# Patient Record
Sex: MALE | Race: WHITE | NOT HISPANIC OR LATINO | URBAN - METROPOLITAN AREA
[De-identification: names, ages, dates, MRNs, and addresses within clinical notes are randomized per-mention and may not be internally consistent; named-entity substitution may affect disease eponyms.]

---

## 2024-08-08 ENCOUNTER — OFFICE VISIT (OUTPATIENT)
Dept: INTERNAL MEDICINE | Facility: CLINIC | Age: 39
End: 2024-08-08

## 2024-08-08 ENCOUNTER — CLINICAL SUPPORT (OUTPATIENT)
Dept: INTERNAL MEDICINE | Facility: CLINIC | Age: 39
End: 2024-08-08

## 2024-08-08 VITALS
BODY MASS INDEX: 24.76 KG/M2 | HEART RATE: 60 BPM | DIASTOLIC BLOOD PRESSURE: 81 MMHG | SYSTOLIC BLOOD PRESSURE: 116 MMHG | WEIGHT: 163.38 LBS | HEIGHT: 68 IN | OXYGEN SATURATION: 98 %

## 2024-08-08 DIAGNOSIS — Z00.00 ROUTINE GENERAL MEDICAL EXAMINATION AT A HEALTH CARE FACILITY: Primary | ICD-10-CM

## 2024-08-08 DIAGNOSIS — Z00.00 ENCOUNTER FOR SCREENING AND PREVENTATIVE CARE: Primary | ICD-10-CM

## 2024-08-08 DIAGNOSIS — G89.29 CHRONIC BACK PAIN, UNSPECIFIED BACK LOCATION, UNSPECIFIED BACK PAIN LATERALITY: ICD-10-CM

## 2024-08-08 DIAGNOSIS — M54.9 CHRONIC BACK PAIN, UNSPECIFIED BACK LOCATION, UNSPECIFIED BACK PAIN LATERALITY: ICD-10-CM

## 2024-08-08 LAB
ALBUMIN SERPL BCP-MCNC: 4.4 G/DL (ref 3.5–5.2)
ALP SERPL-CCNC: 63 U/L (ref 55–135)
ALT SERPL W/O P-5'-P-CCNC: 69 U/L (ref 10–44)
ANION GAP SERPL CALC-SCNC: 10 MMOL/L (ref 8–16)
AST SERPL-CCNC: 45 U/L (ref 10–40)
BILIRUB SERPL-MCNC: 0.8 MG/DL (ref 0.1–1)
BUN SERPL-MCNC: 14 MG/DL (ref 6–20)
CALCIUM SERPL-MCNC: 9.5 MG/DL (ref 8.7–10.5)
CHLORIDE SERPL-SCNC: 105 MMOL/L (ref 95–110)
CHOLEST SERPL-MCNC: 180 MG/DL (ref 120–199)
CHOLEST/HDLC SERPL: 4.1 {RATIO} (ref 2–5)
CO2 SERPL-SCNC: 26 MMOL/L (ref 23–29)
CREAT SERPL-MCNC: 0.8 MG/DL (ref 0.5–1.4)
ERYTHROCYTE [DISTWIDTH] IN BLOOD BY AUTOMATED COUNT: 12.8 % (ref 11.5–14.5)
EST. GFR  (NO RACE VARIABLE): >60 ML/MIN/1.73 M^2
ESTIMATED AVG GLUCOSE: 108 MG/DL (ref 68–131)
GLUCOSE SERPL-MCNC: 100 MG/DL (ref 70–110)
HBA1C MFR BLD: 5.4 % (ref 4–5.6)
HCT VFR BLD AUTO: 45.4 % (ref 40–54)
HCV AB SERPL QL IA: NORMAL
HDLC SERPL-MCNC: 44 MG/DL (ref 40–75)
HDLC SERPL: 24.4 % (ref 20–50)
HGB BLD-MCNC: 15.2 G/DL (ref 14–18)
HIV 1+2 AB+HIV1 P24 AG SERPL QL IA: NORMAL
LDLC SERPL CALC-MCNC: 119.8 MG/DL (ref 63–159)
MCH RBC QN AUTO: 31.1 PG (ref 27–31)
MCHC RBC AUTO-ENTMCNC: 33.5 G/DL (ref 32–36)
MCV RBC AUTO: 93 FL (ref 82–98)
NONHDLC SERPL-MCNC: 136 MG/DL
PLATELET # BLD AUTO: 274 K/UL (ref 150–450)
PMV BLD AUTO: 9.7 FL (ref 9.2–12.9)
POTASSIUM SERPL-SCNC: 4 MMOL/L (ref 3.5–5.1)
PROT SERPL-MCNC: 7.5 G/DL (ref 6–8.4)
RBC # BLD AUTO: 4.89 M/UL (ref 4.6–6.2)
SODIUM SERPL-SCNC: 141 MMOL/L (ref 136–145)
TRIGL SERPL-MCNC: 81 MG/DL (ref 30–150)
TSH SERPL DL<=0.005 MIU/L-ACNC: 1.59 UIU/ML (ref 0.4–4)
WBC # BLD AUTO: 9.19 K/UL (ref 3.9–12.7)

## 2024-08-08 PROCEDURE — 99999 PR PBB SHADOW E&M-EST. PATIENT-LVL I: CPT | Mod: PBBFAC,,,

## 2024-08-08 PROCEDURE — 83036 HEMOGLOBIN GLYCOSYLATED A1C: CPT | Performed by: EMERGENCY MEDICINE

## 2024-08-08 PROCEDURE — 80053 COMPREHEN METABOLIC PANEL: CPT | Performed by: EMERGENCY MEDICINE

## 2024-08-08 PROCEDURE — 99211 OFF/OP EST MAY X REQ PHY/QHP: CPT | Mod: PBBFAC

## 2024-08-08 PROCEDURE — 99999 PR PBB SHADOW E&M-NEW PATIENT-LVL IV: CPT | Mod: PBBFAC,,, | Performed by: EMERGENCY MEDICINE

## 2024-08-08 PROCEDURE — 85027 COMPLETE CBC AUTOMATED: CPT | Performed by: EMERGENCY MEDICINE

## 2024-08-08 PROCEDURE — 87389 HIV-1 AG W/HIV-1&-2 AB AG IA: CPT | Performed by: EMERGENCY MEDICINE

## 2024-08-08 PROCEDURE — 99204 OFFICE O/P NEW MOD 45 MIN: CPT | Mod: PBBFAC,27 | Performed by: EMERGENCY MEDICINE

## 2024-08-08 PROCEDURE — 99385 PREV VISIT NEW AGE 18-39: CPT | Mod: S$PBB,,, | Performed by: EMERGENCY MEDICINE

## 2024-08-08 PROCEDURE — 80061 LIPID PANEL: CPT | Performed by: EMERGENCY MEDICINE

## 2024-08-08 PROCEDURE — 84443 ASSAY THYROID STIM HORMONE: CPT | Performed by: EMERGENCY MEDICINE

## 2024-08-08 PROCEDURE — 86803 HEPATITIS C AB TEST: CPT | Performed by: EMERGENCY MEDICINE

## 2024-08-08 NOTE — PROGRESS NOTES
[facility]      TODAY'S Date 8/8/2024  Patient ID: Delma Christianson is a 39 y.o. male   MRN: [id number]  Primary Care Physician (PCP):  Urmila, Primary Doctor    SUBJECTIVE     Chief Complaint:   Chief Complaint   Patient presents with    [facility]      HPI:   Reviewed medical, surgical, social and family history, medications, appropriate preventive health screenings, as well as vaccination history. Updates as noted below or in assessment and plan.    This is a very pleasant 39 y.o. nonsmoking male with no significant past medical history.  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 The patient is currently in good health, with the exception of some arthralgias and myalgias.  He reports a history of a broken toe sustained while snowboarding in college, which continues to cause pain around the toe. He also has a diagnosis of patellar tendonitis and plantar fasciitis, for which he is currently managing with stretching exercises. His exercise routine includes push-ups, sit-ups, calisthenics, machine-based workouts, and yoga. He reports experiencing stiffness in his back in the morning.  Functionally, the patient does not report limitations due to his symptoms as he is still able to do his hobbies including yard work, arts, and crafts.  He has been  for 16 months, with a relationship spanning 5-6 years. His wife primarily works remotely in [employer]. His typical sleep schedule is from 9:00 PM to 6:30-7:30 AM, during which he snores. He may nap once and feels better after a brief nap.  He follows a vegan diet, supplemented with a multivitamin, collagen gummies, and he consumes 1-2 cups of coffee daily, with the last cup at 2 PM. He also drinks water regularly and had one glass of wine this week, noting that he had not consumed alcohol for the previous three  years.    SCREENING:   Prostate:    n/a as < 50 years old and no family history  Colonoscopy:    n/a as < 45 years old and no family history colon cancer  DEXA:     N/a as < 70 years old and little to no clinical risk factors for fracture independent of bone mineral density   Depression:    Negative   Anxiety:    Negative  Medications:    Reviewed and Reconciled  Eye Exam:    UTD, wears corrective lens  Dental Exam:    Routine q 6 months   Ear Exam:    No complaints of hearing loss, tinnitus, noise exposure, or vertigo   Skin:     No new concerning moles or lesions   Routinely uses sunscreen.   Negative for recent sunburn, moderate sun exposure in the past, several blistering sunburns, mole removal.  No history of tanning bed use or melanoma in first degree relatives.  Sex:     Monogamous relationship, contraception by condom  Transportation safety:   Uses restraint consistently, does not drink alcohol before or while driving  Firearm safety:   Discussed  Lung cancer risk/Tobacco use:   Tobacco Use: Low Risk  (8/14/2024)    Patient History     Smoking Tobacco Use: Never     Smokeless Tobacco Use: Never     Passive Exposure: Not on file         There is no immunization history on file for this patient.  Past Medical History:   Diagnosis Date    Known health problems: none      Past Surgical History:   Procedure Laterality Date    NO PAST SURGERIES       Family history: Previous history in mother of kidney disease has been removed  Social History     Tobacco Use    Smoking status: Never    Smokeless tobacco: Never   Substance Use Topics    Alcohol use: Not Currently     Alcohol/week: 1.0 standard drink of alcohol     Types: 1 Glasses of wine per week    Drug use: Never     Past Medical, Surgical and Social history reviewed and verified by me.     Review of patient's allergies indicates:  No Known Allergies  No current outpatient medications on file prior to visit.     No current facility-administered medications on  "file prior to visit.       Review of Systems as per HPI  ROS  Constitutional: Negative for activity change, appetite change, fatigue, diaphoresis, chills and fever.   Respiratory: Negative for apnea, choking, chest tightness and wheezing.  Genitourinary: Negative for hematuria, flank pain, frequency and dysuria.   Skin: Negative for color change, pallor, rash and wound. Patient denies concerning moles or lesions.  Psychiatric/Behavioral: Negative for agitation, behavioral problems, confusion, decreased concentration and dysphoric mood.     All other systems reviewed and are negative.    OBJECTIVE     PHYSICAL EXAM  Vitals:    08/08/24 1034   BP: 116/81   Pulse: 60   SpO2: 98%   Weight: 74.1 kg (163 lb 5.8 oz)   Height: 5' 7.5" (1.715 m)     Vital Signs (Most Recent):  Pulse: 60 (08/08/24 1034)  BP: 116/81 (08/08/24 1034)  SpO2: 98 % (08/08/24 1034)   Weight:   Wt Readings from Last 1 Encounters:   08/08/24 74.1 kg (163 lb 5.8 oz)     Body mass index is 25.21 kg/m².      Vital signs and nursing assessment noted:  Relatively normal vital    GEN:   NAD, A & Ox3, atraumatic, well appearing, nontoxic appearing  HEENT:  PERRLA, EOMI, moist membranes, nl conjunctiva, no scleral icterus, no nystagmus, no nodes/nodules, soft, supple, FROM, no trachial deviation, nexus negative, normal TMs bilaterally, normal pharynx  CV:   RRR no m/r/g, 2+ radial pulses, <2sec cap refill, no obvious JVD  RESP:  CTA B, no w/r/r, equal and bilateral chest rise, no respiratory distress  ABD:   soft, Nontender, Nondistended, +BS, no guarding/rebound  :   Deferred  BACK:  FROM, no midline tenderness, no paraspinal tenderness  EXT:   FROM, VELA x 4, no swelling, no edema, no calf tenderness, no bony tenderness, no warmth or redness, no crepitus, no obvious deformity  LYMPH:  no gross adenopathy  NEURO:  GCS 15, CN II-XII grossly intact, no obvious motor/sensory deficit, no tremor, negative Romberg,  nl gait/coordination  PSYCH:   Cooperative, no " SI/HI, no anxiety, nl mood/affect, nl judgement/thought process  SKIN:  no rashes/lesions or masses, nl color, no pallor, warm, dry, intact      Tests      Labs reviewed and independently interpreted. Imaging studies reviewed.   Recent Results (from the past 2016 hour(s))   Comprehensive metabolic panel    Collection Time: 08/08/24  9:57 AM   Result Value Ref Range    Sodium 141 136 - 145 mmol/L    Potassium 4.0 3.5 - 5.1 mmol/L    Chloride 105 95 - 110 mmol/L    CO2 26 23 - 29 mmol/L    Glucose 100 70 - 110 mg/dL    BUN 14 6 - 20 mg/dL    Creatinine 0.8 0.5 - 1.4 mg/dL    Calcium 9.5 8.7 - 10.5 mg/dL    Total Protein 7.5 6.0 - 8.4 g/dL    Albumin 4.4 3.5 - 5.2 g/dL    Total Bilirubin 0.8 0.1 - 1.0 mg/dL    Alkaline Phosphatase 63 55 - 135 U/L    AST 45 (H) 10 - 40 U/L    ALT 69 (H) 10 - 44 U/L    eGFR >60.0 >60 mL/min/1.73 m^2    Anion Gap 10 8 - 16 mmol/L   CBC Without Differential    Collection Time: 08/08/24  9:57 AM   Result Value Ref Range    WBC 9.19 3.90 - 12.70 K/uL    RBC 4.89 4.60 - 6.20 M/uL    Hemoglobin 15.2 14.0 - 18.0 g/dL    Hematocrit 45.4 40.0 - 54.0 %    MCV 93 82 - 98 fL    MCH 31.1 (H) 27.0 - 31.0 pg    MCHC 33.5 32.0 - 36.0 g/dL    RDW 12.8 11.5 - 14.5 %    Platelets 274 150 - 450 K/uL    MPV 9.7 9.2 - 12.9 fL   Lipid panel    Collection Time: 08/08/24  9:57 AM   Result Value Ref Range    Cholesterol 180 120 - 199 mg/dL    Triglycerides 81 30 - 150 mg/dL    HDL 44 40 - 75 mg/dL    LDL Cholesterol 119.8 63.0 - 159.0 mg/dL    HDL/Cholesterol Ratio 24.4 20.0 - 50.0 %    Total Cholesterol/HDL Ratio 4.1 2.0 - 5.0    Non-HDL Cholesterol 136 mg/dL   Hemoglobin A1c    Collection Time: 08/08/24  9:57 AM   Result Value Ref Range    Hemoglobin A1C 5.4 4.0 - 5.6 %    Estimated Avg Glucose 108 68 - 131 mg/dL   TSH    Collection Time: 08/08/24  9:57 AM   Result Value Ref Range    TSH 1.588 0.400 - 4.000 uIU/mL   Hepatitis C Antibody    Collection Time: 08/08/24  9:57 AM   Result Value Ref Range    Hepatitis  C Ab Non-reactive Non-reactive   HIV 1/2 Ag/Ab (4th Gen)    Collection Time: 08/08/24  9:57 AM   Result Value Ref Range    HIV 1/2 Ag/Ab Non-reactive Non-reactive       ASSESSMENT:     1. Encounter for screening and preventative care    2. Chronic back pain, unspecified back location, unspecified back pain laterality        Health Maintenance Due   Topic Date Due    TETANUS VACCINE  Never done    COVID-19 Vaccine (1 - 2023-24 season) Never done     Health Maintenance   Topic Date Due    TETANUS VACCINE  Never done    Lipid Panel  08/08/2029    Hepatitis C Screening  Completed       39 y.o. nonsmoking male with no significant past medical history outside of musculoskeletal problems presents for annual exam in [facility]. Functionally, the patient does not report limitations due to his symptoms as he is able to complete his hobbies and does some exercises.  However sometimes he has limitations with lower back pain.  Home medications (none) and health maintenance reviewed.  Exam is relatively benign.  No obvious skin lesions suspicious for malignancy noted. Immunization status: stated as current, but no records available. Scheduled cancer screenings completed. FIB-4 Calculation: 0.    MDM  Reviewed: nursing note and vitals  Reviewed previous: labs  Interpretation: labs (Elevated ALT and AST otherwise relatively unremarkable CMP, Lipid Panel, CBC, TSH, HgA1C, HIV, Hep C ab)      PLAN:     Periodic health maintenance visits annually or sooner with concerns.  Patient to establish with a primary care physician.  Patient to provide records of vaccines otherwise vaccinations to be obtained at local pharmacy or with medical provider patient is choosing  Routine annual labs CMP, CBC, Lipid, HgA1C, TSH.  Repeat CMP plus or minus abdominal/liver ultrasound.  Patient to establish MyChart/portal account so that he can see results  Encouraged healthy eating, exercise, flexibility training possibly in the form of yoga and  avoidance of alcohol.  Recommend a high fiber, lean protein diet that is high in complex carbohydrates such as non-starchy vegetables and whole grains.   Recommend 150 minutes of moderate-intensity physical activity and 2 days of muscle strengthening activity weekly.  Recommend hearing protection when in noisy environments.   Recommend daily sun protection/avoidance, use of at least SPF 30, broad spectrum sunscreen (OTC drug), and routine physician surveillance to optimize early detection.  Orders Placed This Encounter   Procedures    Ambulatory referral/consult to [facility]    Ambulatory referral/consult to Dermatology       The results of physical exam findings and labs were reviewed with the patient. Management of above assessments/visit diagnoses was discussed with patient. Precautions for return discussed at length. Patient was given ample time for questions. All questions asked and answered to the satisfaction of the patient. Patient is in agreement with the above and verbalized understanding. Total time spent caring for the patient today was 30 minutes. This includes time spent before the visit reviewing the chart, time spent during the visit, and time spent after the visit on documentation.    Rich Tan MD  [facility]    Disclaimer: This document was created using voice recognition software (M*Modal Fluency Direct). Although it may be edited, this document may contain errors related to incorrect recognition of the spoken word. Please contact the physician if clarification is needed.

## 2024-09-16 ENCOUNTER — TELEPHONE (OUTPATIENT)
Facility: CLINIC | Age: 39
End: 2024-09-16
Payer: COMMERCIAL

## 2024-09-16 NOTE — TELEPHONE ENCOUNTER
----- Message from Blanca Stevenson sent at 9/16/2024 10:16 AM CDT -----    Patient Returning Call        Who Called:pt  Does the patient know what this is regarding?:has a referral need an appt  Would the patient rather a call back or a response via [facility]? call  Best Call Back Number:[phone]  Additional Information: call back

## 2024-09-16 NOTE — TELEPHONE ENCOUNTER
Advised patient via voicemail message that we currently do not have any new patient appointments available at the [facility]. Encouraged patient to reach out to appointment desk for scheduling at alternate campus.

## 2024-09-26 ENCOUNTER — OFFICE VISIT (OUTPATIENT)
Dept: PAIN MEDICINE | Facility: CLINIC | Age: 39
End: 2024-09-26
Payer: COMMERCIAL

## 2024-09-26 VITALS
DIASTOLIC BLOOD PRESSURE: 81 MMHG | HEART RATE: 58 BPM | SYSTOLIC BLOOD PRESSURE: 123 MMHG | BODY MASS INDEX: 25.34 KG/M2 | WEIGHT: 164.25 LBS

## 2024-09-26 DIAGNOSIS — M54.50 LUMBAR BACK PAIN: Primary | ICD-10-CM

## 2024-09-26 DIAGNOSIS — G89.29 CHRONIC BACK PAIN, UNSPECIFIED BACK LOCATION, UNSPECIFIED BACK PAIN LATERALITY: ICD-10-CM

## 2024-09-26 DIAGNOSIS — M54.9 CHRONIC BACK PAIN, UNSPECIFIED BACK LOCATION, UNSPECIFIED BACK PAIN LATERALITY: ICD-10-CM

## 2024-09-26 PROCEDURE — 99999 PR PBB SHADOW E&M-EST. PATIENT-LVL III: CPT | Mod: PBBFAC,,, | Performed by: PHYSICIAN ASSISTANT

## 2024-09-26 PROCEDURE — 99499 UNLISTED E&M SERVICE: CPT | Mod: S$GLB,,, | Performed by: PHYSICIAN ASSISTANT

## 2024-09-26 NOTE — PROGRESS NOTES
[facility] New Patient Evaluation  Healthy [facility] PT Evaluation      Referred by: Dr. Rich Tan    PCP:   none listed    CC:   Chief Complaint   Patient presents with    Back Pain          HPI:   Delma Christianson is a 39 y.o. year old male patient who has a past medical history of Known health problems: none. He presents in referral from Dr. Rich Tan for evaluation of appropriateness for Healthy Back PT program.  He describes intermittent lower lumbar back pain since age 14.  He believes pain may be related to long-term hamstring tightness after playing soccer in his youth and maybe not stretching as much as he should.  Lower back pain is intermittent.  No focal triggering event when it occurs, but it is noticed more after bending forward for a while.  When felt, pain is felt bilateral lower lumbar region.  He denies any radicular leg pain, numbness, tingling extending from the lower back.  The lower back feels stiff in the morning but uses as the day goes on and he feels better in the afternoon typically.    Denies bowel/ bladder incontinence.    Past and current medications:  Antineuropathics:  NSAIDs: ibuprofen, very sparingly as needed; usually manages without medications.  Antidepressants:  Muscle relaxers:  Opioids:  Antiplatelets/Anticoagulants:    Physical Therapy/ Chiropractic care:  PT - age 14 with no further PT.     Pain Intervention History:  none    Past Spine Surgical History:  none        History:  No current outpatient medications on file.    Past Medical History:   Diagnosis Date    Known health problems: none        Past Surgical History:   Procedure Laterality Date    NO PAST SURGERIES         No family history on file.    Social History     Socioeconomic History    Marital status:    Tobacco Use    Smoking status: Never    Smokeless tobacco: Never   Substance and Sexual Activity    Alcohol use: Not Currently     Alcohol/week: 1.0 standard drink of alcohol      Types: 1 Glasses of wine per week    Drug use: Never    Sexual activity: Yes     Partners: Female     Birth control/protection: Condom     Comment: Wife   Social History Narrative    ** Merged History Encounter **          Social Determinants of Health     Financial Resource Strain: Low Risk  (8/1/2024)    Overall Financial Resource Strain (CARDIA)     Difficulty of Paying Living Expenses: Not hard at all   Food Insecurity: No Food Insecurity (8/1/2024)    Hunger Vital Sign     Worried About Running Out of Food in the Last Year: Never true     Ran Out of Food in the Last Year: Never true   Physical Activity: Sufficiently Active (8/1/2024)    Exercise Vital Sign     Days of Exercise per Week: 5 days     Minutes of Exercise per Session: 30 min   Stress: No Stress Concern Present (8/1/2024)    [employer] - Occupational Stress Questionnaire     Feeling of Stress : Not at all   Housing Stability: Unknown (8/1/2024)    Housing Stability Vital Sign     Unable to Pay for Housing in the Last Year: No       Review of patient's allergies indicates:  No Known Allergies    Labs:  Lab Results   Component Value Date    HGBA1C 5.4 08/08/2024       Lab Results   Component Value Date    WBC 9.19 08/08/2024    HGB 15.2 08/08/2024    HCT 45.4 08/08/2024    MCV 93 08/08/2024     08/08/2024           Review of Systems:  Lumbar back pain. .  Balance of review of systems is negative.    Physical Exam:  Vitals:    09/26/24 1119   BP: 123/81   Pulse: (!) 58   Weight: 74.5 kg (164 lb 3.9 oz)   PainSc: 0-No pain   PainLoc: Back     Body mass index is 25.34 kg/m².    Pain disability index:      9/26/2024    11:17 AM   Last 3 PDI Scores   Pain Disability Index (PDI) 5       Gen: NAD  Psych: mood appropriate for given condition  HEENT: eyes anicteric   CV: RRR, 2+ radial pulse  Respiratory: non-labored, no signs of respiratory distress  Abd: non-distended  Skin: warm, dry and intact.  Gait: Able to heel walk,  toe walk. No antalgic gait.     Coordination:   Tandem walking coordination: normal    Cervical spine: ROM is full in flexion, extension and lateral rotation without increased pain.  Spurling's maneuver causes no neck pain to either side.  Myofascial exam: No Tenderness to palpation across cervical paraspinous region bilaterally.    Lumbar spine:  Lumbar spine: ROM is full with flexion extension and oblique extension with no increased pain.    Chris's test causes no increased pain on either side.    Supine straight leg raise is negative bilaterally.    Internal and external rotation of the hip causes no increased pain on either side.  Myofascial exam: No tenderness to palpation across lumbar paraspinous muscles. No tenderness to palpation over the bilateral greater trochanters and bilateral SI joint    Sensory:  Intact and symmetrical to light touch in C4-T1 dermatomes bilaterally. Intact and symmetrical to light touch in L1-S1 dermatomes bilaterally.    Motor:    Right Left   C4 Shoulder Abduction  5  5   C5 Elbow Flexion    5  5   C6 Wrist Extension  5  5   C7 Elbow Extension   5  5   C8/T1 Hand Intrinsics   5  5        Right Left   L2/3 Iliacus Hip flexion  5  5   L3/4 Qudratus Femoris Knee Extension  5  5   L4/5 Tib Anterior Ankle Dorsiflexion   5  5   L5/S1 Extensor Hallicus Longus Great toe extension  5  5   S1/S2 Gastroc/Soleus Plantar Flexion  5  5      Right Left   Triceps DTR 2+ 2+   Biceps DTR 2+ 2+   Brachioradialis DTR 2+ 2+   Patellar DTR 2+ 2+   Achilles DTR 2+ 2+   Harrell Absent  Absent   Clonus Absent Absent   Babinski Absent Absent       Imaging:  No spine imaging to review.      Assessment:   Delma Christianson is a 39 y.o. year old male patient who has a past medical history of Known health problems: none. He presents in referral from Dr. Rich Tan for evaluation of lumbar back pain.    Pain pattern and exam was consistent for myofascial mechanical lumbar back pain without radiculopathy  nor neurological deficit.    Plan:  Previous treatment for lower back pain has not provided relief.    The situation was discussed at length with the patient.  We discussed different causes of back pain and different treatment options.  We discussed the importance of stretching and strengthening.  We discussed posture.  We discussed the pros and cons of further diagnostic testing, alternative treatment and Medications    Based on the history, physical exam, and functional index, an active physical therapy program is recommended.  The goal is to restore the patients function and reduce pain.  A program of progressive resistance exercises, biomechanical, and mobility maneuvers, instructions in proper body mechanics, aerobic conditioning and HEP will be utilized. The program will continue as long as making improvements.    An assessment of patients progress will be made at each visit to document change in status.    The patient will be actively involved in their own treatment, and responsible for appointments and home program    The patient's lumbar isometric strength will be tested and they will be placed in a program of isolated strength training based on 50% of their total functional torque and advanced as clinically appropriate.      Directional preference of pain will further influence the patients active rehabilitation program    The patient was instructed there might be an initial increase in discomfort    They are enrolled with good prognosis    Problem List Items Addressed This Visit    None  Visit Diagnoses       Chronic back pain, unspecified back location, unspecified back pain laterality                Follow Up: RTC as needed    : Reviewed and consistent with medication use as prescribed.    Thank you for referring this interesting patient, and I look forward to continuing to collaborate in his care.        Tameka Fischer PA-C  [facility]

## 2024-10-09 ENCOUNTER — CLINICAL SUPPORT (OUTPATIENT)
Dept: REHABILITATION | Facility: HOSPITAL | Age: 39
End: 2024-10-09
Payer: COMMERCIAL

## 2024-10-09 DIAGNOSIS — G89.29 CHRONIC BACK PAIN, UNSPECIFIED BACK LOCATION, UNSPECIFIED BACK PAIN LATERALITY: ICD-10-CM

## 2024-10-09 DIAGNOSIS — M54.9 CHRONIC BACK PAIN, UNSPECIFIED BACK LOCATION, UNSPECIFIED BACK PAIN LATERALITY: ICD-10-CM

## 2024-10-09 DIAGNOSIS — M54.50 CHRONIC BILATERAL LOW BACK PAIN WITHOUT SCIATICA: Primary | ICD-10-CM

## 2024-10-09 DIAGNOSIS — G89.29 CHRONIC BILATERAL LOW BACK PAIN WITHOUT SCIATICA: Primary | ICD-10-CM

## 2024-10-09 DIAGNOSIS — M54.50 LUMBAR BACK PAIN: ICD-10-CM

## 2024-10-09 DIAGNOSIS — R29.898 DECREASED STRENGTH OF TRUNK AND BACK: ICD-10-CM

## 2024-10-09 DIAGNOSIS — M25.69 DECREASED ROM OF TRUNK AND BACK: ICD-10-CM

## 2024-10-09 PROCEDURE — 97750 PHYSICAL PERFORMANCE TEST: CPT | Mod: 32,PO | Performed by: PHYSICAL THERAPIST

## 2024-10-09 NOTE — PLAN OF CARE
[facility]  Physical Therapy Lumbar Evaluation      Name: Delma Christianson  Clinic Number: [id number]    Therapy Diagnosis: No diagnosis found.  Physician: Tameka Fischer PA-C    Physician Orders: PT Eval and Treat- Healthy Back  Medical Diagnosis from Referral: chronic back pain, lumbar back pain  Evaluation Date: 10/9/2024  Authorization Period Expiration: 9/26/25  Plan of Care Expiration: 12/18/2024  Reassessment Due: 11/9/2024  Visit # / Visits authorized: 1/1  MedX testing visit 2    Time In: 10:55AM  Time Out: 11:55am  Total Billable Time: 60 minutes    INSURANCE and OUTCOMES: Program Benefit Group with Lumbar Outcomes (Oswestry and AQoL) 1/3  Precautions: standard    Pattern of pain determined: Pattern 1 PEP    Subjective     Date of onset: chronic, intermittent    History of current condition: Delma reports aching, soreness of low back, greater on right which seems to worsen with prolonged bending activities. He feels that his tight hamstrings contribute to his back pain. He often finds himself stretching into extension to ease sc. He works out occasionally, less so since having child 18 months ago. He denies any radicular LE pain or paresthesias. He reports some morning stiffness that improves with movement.  Medical History:   Past Medical History:   Diagnosis Date    Known health problems: none        Surgical History:   Delma Christianson  has a past surgical history that includes No past surgeries.    Medications:   Delma currently has no medications in their medication list.    Allergies:   Review of patient's allergies indicates:  No Known Allergies     Imaging: No relevant imaging on file     Prior Therapy: many years ago  Prior Treatment: chiropractor 1x  Social History:   lives with their spouse, 18mo child  Occupation: [employer]  Leisure: garden, watch baby, play golf, painting/art      Prior Level of Function: no difficulty with prolonged bending  Current  Level of Function: pain does not limit function, however prolonged and repetitive bending increase discomfort.  DME owned/used: no  Gym Membership: not since June    Pain:  Current 3/10, worst 7/10, best 0/10   Location: low back worse on right   Description: Aching and Tight  Aggravating Factors: Bending  Easing Factors: rest and extension  Disturbed Sleep: no    Pattern of pain questions:  1.  Where is your pain the worst? Right lumbar  2.  Is your pain constant or intermittent? intermittent  3.  Does bending forward make your typical pain worse? yes  4.  Since the start of your back pain, has there been a change in your bowel or bladder? no  5.  What can't you do now that you use to be able to do? Prolonged bending activities    Pts goals: strengthen core and back, prevent episodes of pain    Red Flag Screening:   Cough/Sneeze Strain: (--)  Bladder/Bowel: (--)  Falls: (--)  Night pain: (--)  Unexplained weight loss: (--)  General health: good    Objective      Postural examination/scapula alignment: Rounded shoulder, Head forward, and Decreased lordosis  Joint integrity: normal  Skin integrity:WNL   Edema: None  Correction of posture: better with lumbar roll  Sitting: flexed  Standing: as above    MOVEMENT LOSS - Lumbar   Norms ROM Loss Initial   Flexion Fingers touch toes, sacral angle >/= 70 deg, uniform spinal curvature, posterior weight shift  moderate loss   Extension ASIS surpasses toes, spine of scapulae surpasses heels, uniform spinal curve minimal loss   Side glide Right  minimal loss   Side glide Left  within functional limits   Rotation Right PT observes contralateral shoulder minimal loss   Rotation Left PT observes contralateral shoulder minimal loss     Lower Extremity Strength  Right LE  Left LE    Hip flexion: 4+/5 Hip flexion: 4+/5   Hip extension:  5/5 Hip extension: 5/5   Hip abduction: 4+/5 Hip abduction: 4+/5   Hip adduction:  5/5 Hip adduction:  5/5   Hip External Rotation 4+/5 Hip External  Rotation 4+/5   Hip Internal rotation   5/5 Hip Internal rotation 5/5   Knee Flexion 5/5 Knee Flexion 5/5   Knee Extension 5/5 Knee Extension 5/5   Ankle dorsiflexion: 5/5 Ankle dorsiflexion: 5/5   Ankle plantarflexion: 5/5 Ankle plantarflexion: 5/5     GAIT:  Assistive Device used: none  Level of Assistance: independent  Patient displays the following gait deviations: no gait deviations observed.     Special Tests:   Test Name  Test Result   Prone Instability Test (--)   SI Joint Provocation Test (--)   Straight Leg Raise (--)   Neural Tension Test (--)   Crossed Straight Leg Raise (--)   Walking on toes Able   Walking on heels  Able     NEUROLOGICAL SCREENING:     Sensory deficits: no    Reflexes:    Left Right   Patella Tendon 2+ 2+   Achilles Tendon 2+ 2+   Babinski  (--) (--)   Clonus (--) (--)     REPEATED TEST MOVEMENTS:    Baseline symptoms:  Repeated Flexion in Standing worse   Repeated Extension in Standing better   Repeated Flexion in lying no effect   Repeated Extension in lying  better       STATIC TESTS and other movements:   Prone lie better   Prone lie on elbows better   Sitting slouched  worse   Sitting erect better   Sustained flexion no effect     Lumbar testing Visit 2    OUTCOMES SELECTION:   Program Patient Outcome Measures    Oswestry Score:  3/50 = 6% disability     AQoL Score:  2/36 = 1% disability          Treatment     Total Treatment time separate from Evaluation: 40 minutes    Eidustin received therapeutic exercises to develop/improve posture, lumbar ROM, strength, and muscular endurance for 15 minutes including the following exercises:     Prone on elbows 2 min  Press ups 2/10  Bridging 2/10  Standing extension x10    Written Home Exercises Provided: yes.    HEP AS FOLLOWS:  Prone on elbows  Press ups  Bridging  Standing extension    Exercises were reviewed and Delma was able to demonstrate them prior to the end of the session. Delma demonstrated good  understanding of the education  provided.     See EMR under Patient Instructions for exercises provided 10/9/2024.    MedX Testing:  MedX testing to be performed next visit          10/9/2024     3:46 PM   [facility]   Visit Number 1   VAS Pain Rating 3   Extension in Lying 20   Extension in Standing 10         Therapeutic Education/Activity provided for 25 minutes:   - Patient was given an [facility] Visit 1 handout which discusses the following:  - what to expect in therapy  - an overview of the program, including health coaching and wellness  - importance of spinal hygiene, proper posture, lifting mechanics, sleep quality, and nutrition/hydration   - Leslye roll trialed, recommended, and purchase information was provided.  - Patient received a handout regarding anticipated muscular soreness following the isometric test and strategies for management were reviewed with patient including stretching, using ice and scheduled rest.   - Patient received verbal education on the following:   - Healthy Back program   - purpose of the isometric test  - safe progression of lumbar strengthening, wellness approach, and systemic strengthening.   - safe usage of MedX machine and testing protocols.    Delma received cold pack for 00 minutes to low back in [place].    Assessment     Delma is a 39 y.o. male referred to [facility] with a medical diagnosis of chronic back pain, lumbar back pain. Pt presents with intermittent episodes of low back pain, worse on right. He has postural habits that may be contributing to his pain. He has tight hip/LE musculature and some limitation of lumbar ROM. Isometric back strength will be assessed next visit. An active physical therapy program is recommended with the goal to restore function and reduce pain. A program of progressive resisted exercise, stretching, instruction in proper body mechanics, aerobic conditioning and a HEP will be included      Pain Pattern: pattern 1 PEP       Pt prognosis is  Excellent.     Pt will benefit from skilled outpatient Physical Therapy to address the deficits stated above and in the chart below, to provide pt/family education, and to maximize pt's level of independence. Based on the above history and physical examination an active physical therapy program is recommended.      Plan of care discussed with patient: Yes  Pt's spiritual, cultural and educational needs considered and patient is agreeable to the plan of care and goals as stated below:     Anticipated Barriers for therapy: none    PT Evaluation Completed? Yes    Medical necessity is demonstrated by the following problem list:    History  Co-morbidities and personal factors that may impact the plan of care [x] LOW: no personal factors / co-morbidities  [] MODERATE: 1-2 personal factors / co-morbidities  [] HIGH: 3+ personal factors / co-morbidities    Moderate / High Support Documentation:   Co-morbidities affecting plan of care:     Personal Factors:   no deficits     Examination  Body Structures and Functions, activity limitations and participation restrictions that may impact the plan of care [] LOW: addressing 1-2 elements  [x] MODERATE: 3+ elements  [] HIGH: 4+ elements (please support below)    Moderate / High Support Documentation: See objective findings in evaluation above     Clinical Presentation [x] LOW: stable  [] MODERATE: Evolving  [] HIGH: Unstable     Decision Making/ Complexity Score: low         GOALS: Pt is in agreement with the following goals.    Short term goals:  6 weeks or 10 visits   - Pt will demonstrate increased lumbar MedX ROM by at least 3 degrees from the initial ROM value with improvements noted in functional ROM and ability to perform ADLs. Appropriate and Ongoing  - Pt will demonstrate increased MedX average isometric strength value by 20% from initial test resulting in improved ability to perform bending, lifting, and carrying activities safely, confidently. Appropriate and Ongoing  -  Pt will report a reduction in worst pain score by 1-2 points for improved tolerance for bending. Appropriate and Ongoing  - Pt able to perform HEP correctly with minimal cueing or supervision from therapist to encourage independent management of symptoms. Appropriate and Ongoing    Long term goals: 10 weeks or 20 visits   - Pt will demonstrate increased lumbar MedX ROM by at least 6 degrees from initial ROM value, resulting in improved ability to perform functional forward bending while standing and sitting. Appropriate and Ongoing  - Pt will demonstrate increased MedX average isometric strength value by 30% from initial test resulting in improved ability to perform bending, lifting, and carrying activities safely and confidently. Appropriate and Ongoing  - Pt to demonstrate ability to independently control and reduce their pain through posture positioning and mechanical movements throughout a typical day. Appropriate and Ongoing  - Pt will demonstrate reduced pain and improved functional outcomes as reported on the Oswestry Disability Index by reaching a score of 2% or less in order to demonstrate subjective improvement in pt's condition. . Appropriate and Ongoing  - Pt will demonstrate independence with the HEP at discharge. Appropriate and Ongoing  - Pt will strengthen core and back, prevent episodes of pain(patient goal) Appropriate and Ongoing    Plan     Outpatient physical therapy 2x week for 10 weeks or 20 visits to include the following:   - Patient education  - Therapeutic exercise  - Manual therapy  - Performance testing   - Neuromuscular Re-education  - Therapeutic activity   - Modalities    Pt may be seen by PTA as part of the rehabilitation team.     Therapist: Senia Machuca, PT  10/9/2024

## 2024-10-10 PROBLEM — R29.898 DECREASED STRENGTH OF TRUNK AND BACK: Status: ACTIVE | Noted: 2024-10-10

## 2024-10-10 PROBLEM — G89.29 CHRONIC BILATERAL LOW BACK PAIN WITHOUT SCIATICA: Status: ACTIVE | Noted: 2024-10-10

## 2024-10-10 PROBLEM — M54.50 CHRONIC BILATERAL LOW BACK PAIN WITHOUT SCIATICA: Status: ACTIVE | Noted: 2024-10-10

## 2024-10-10 PROBLEM — M25.69 DECREASED ROM OF TRUNK AND BACK: Status: ACTIVE | Noted: 2024-10-10

## 2024-10-18 ENCOUNTER — CLINICAL SUPPORT (OUTPATIENT)
Dept: REHABILITATION | Facility: HOSPITAL | Age: 39
End: 2024-10-18
Payer: COMMERCIAL

## 2024-10-18 DIAGNOSIS — M25.69 DECREASED ROM OF TRUNK AND BACK: ICD-10-CM

## 2024-10-18 DIAGNOSIS — G89.29 CHRONIC BILATERAL LOW BACK PAIN WITHOUT SCIATICA: Primary | ICD-10-CM

## 2024-10-18 DIAGNOSIS — R29.898 DECREASED STRENGTH OF TRUNK AND BACK: ICD-10-CM

## 2024-10-18 DIAGNOSIS — M54.50 CHRONIC BILATERAL LOW BACK PAIN WITHOUT SCIATICA: Primary | ICD-10-CM

## 2024-10-18 PROCEDURE — 97750 PHYSICAL PERFORMANCE TEST: CPT | Mod: 32,PO | Performed by: PHYSICAL THERAPIST

## 2024-10-18 NOTE — PROGRESS NOTES
[facility]  Physical Therapy Treatment Note     Name: Delma Christianson  Clinic Number: [id number]    Therapy Diagnosis:   Encounter Diagnoses   Name Primary?    Chronic bilateral low back pain without sciatica Yes    Decreased ROM of trunk and back     Decreased strength of trunk and back      Physician: Tameka Fischer PA-C    Visit Date: 10/18/2024    Physician Orders: PT Eval and Treat- Healthy Back  Medical Diagnosis from Referral: chronic back pain, lumbar back pain  Evaluation Date: 10/9/2024  Authorization Period Expiration: 12/31/24  Plan of Care Expiration: 12/18/2024  Reassessment Due: 11/9/2024  Visit # / Visits authorized: 1/20 (+1 prior auth)  MedX testing visit 2  PTA Visit #: 0/5     Time In: 9:00AM  Time Out: 10:00AM  Total Billable Time: 60 minutes  INSURANCE and OUTCOMES: Program Benefit Group with Lumbar Outcomes (Oswestry and AQoL) 1/3    Precautions: standard    Pattern of pain determined: Pattern 1 PEP    Subjective     Delma Christianson reports he does not have pain currently. He has mild tightness. He has been more aware of sitting posture and is stretching regularly..      Patient reports tolerating previous visit without adverse effect.  Patient reports their pain to be 0/10 on a 0-10 scale with 0 being no pain and 10 being the worst pain imaginable.  Pain Location:  low back worse on right      Work and leisure: Occupation: Peds ER MD  Leisure: garden, watch baby, play golf, painting/art  Pt goals: strengthen core and back, prevent episodes of pain    Objective      Lumbar  Isometric Testing on Med X equipment: Testing administered by PT    Test Initial Baseline Midpoint Final   Date 10/18/24     ROM 0-54 deg     Max Peak Torque 227      Min Peak Torque 122      Flex/Ext Ratio 1.9:1     % variance  normative data -30%     % change from initial test N/A visit 1             Outcomes: intake score  OUTCOMES SELECTION:   Program Patient Outcome  Measures     Oswestry Score:  3/50 = 6% disability      AQoL Score:  2/36 = 1% disability          Visit 6 Score:   Visit 10 Score:   Discharge Score:  Goal Score: 0%     Treatment     Eilan received the treatments listed below:      Medical MedX Treatment as follows:  MedX testing performed day 2: Patient  received neuromuscular education to engage spinal musculature correctly for motor control and engagement of musculature for 15 minutes including the MedX exercise component and practice and standard testing. MedX dynamic exercise and baseline isometric test performed with instructions to guide the patient safely through the testing procedure. Patient instructed to perform isometric test correctly and safely while building to an optimal force with a pain-free effort. Patient also instructed that they should feel support/pressure from MedX restraints but no pain/discomfort, and encouraged to report any pain to therapist. Patient demonstrated appropriate understanding of information and tolerance of test.  Education regarding purpose of test, safety during test given, and reviewed possible more soreness and strategies.           10/18/2024     9:57 AM   [facility]   Visit Number 2   VAS Pain Rating 0   Treadmill Time (in min.) 10 min   Extension in Lying 20   Extension in Standing 10   Lumbar Extension Seat Pad 0   Femur Restraint 4   Top Dead Center 24   Counterweight 200   Lumbar Flexion 54   Lumbar Extension 0   Lumbar Peak Torque 227 ft. lbs.   Min Torque 122   Test Percent Below Normative Data 30 %       Eilan participated in neuromuscular re-education activities to improve balance, coordination, proprioception, motor control and/or posture for 00 minutes. The following activities were included:      Eilan participated in therapeutic exercises to develop strength, endurance, ROM, flexibility, posture, and core stabilization for 45 minutes including:  Prone on elbows 2 min  Press ups 2/10  Bridging  2/10  Standing extension x10  Hamstring stretch 3x 30 sec      Peripheral muscle strengthening which included one set of 15-20 repetitions at a slow and controlled 10-13 second per rep pace focused on strengthening supporting musculature in order to improve body mechanics and functional mobility. Patient and therapist focused on proper form during treatment to ensure optimal strengthening of each targeted muscle group.  Machines utilized included:Torso rotation, Leg Ext, Leg Curl, Chest Press, Rowing, Triceps, Biceps, Hip Abd, Hip Add, and Leg Press        Eidustin participated in dynamic functional therapeutic activities to improve functional performance and simulate household and community activities for 00  minutes. The following activities were included:      Eidustin received manual therapy techniques for 00  minutes. The following activities were included:      Pt given cold pack for 10 minutes to low back in [place].    Patient Education and Home Exercises     Home exercises include:  Prone on elbows 2 min  Press ups 2/10  Bridging 2/10  Standing extension x10  Cardio program (V5): -  Lifting education (V11): -  Posture/Lumbar roll: instruct visit 1  Fridge Magnet Discharge handout (date given): -  Equipment at home/gym membership: no    Education provided:   - PT role and POC  - HEP  - instruct in improved sitting posture, use of lumbar support and frequent extension throughout day    Written Home Exercises Provided: yes.  Exercises were reviewed and Delma was able to demonstrate them prior to the end of the session.  Delma demonstrated good  understanding of the education provided.     See EMR under Patient Instructions for exercises provided prior visit.    Assessment     Delma presents to second healthy back visit reporting decreased pain, was able to demo HEP with Min VC for form. Pt was able to tolerate Medical MedX machine well as follows:  MedX testing performed and patient tolerated test well. His lumbar  strength is 30% below normative data with isometric testing on MEDX. Pt was also able to complete all of the peripheral strengthening exercises without increased discomfort .     Patient is making good progress towards established goals.  Pt will continue to benefit from skilled outpatient physical therapy to address the deficits stated in the impairment chart, provide pt/family education and to maximize pt's level of independence in the home and community environment.     Anticipated Barriers for therapy: none  Pt's spiritual, cultural and educational needs considered and pt agreeable to plan of care and goals as stated below:     Goals:   Short term goals:  6 weeks or 10 visits   - Pt will demonstrate increased lumbar MedX ROM by at least 3 degrees from the initial ROM value with improvements noted in functional ROM and ability to perform ADLs. Appropriate and Ongoing  - Pt will demonstrate increased MedX average isometric strength value by 20% from initial test resulting in improved ability to perform bending, lifting, and carrying activities safely, confidently. Appropriate and Ongoing  - Pt will report a reduction in worst pain score by 1-2 points for improved tolerance for bending. Appropriate and Ongoing  - Pt able to perform HEP correctly with minimal cueing or supervision from therapist to encourage independent management of symptoms. Appropriate and Ongoing     Long term goals: 10 weeks or 20 visits   - Pt will demonstrate increased lumbar MedX ROM by at least 6 degrees from initial ROM value, resulting in improved ability to perform functional forward bending while standing and sitting. Appropriate and Ongoing  - Pt will demonstrate increased MedX average isometric strength value by 30% from initial test resulting in improved ability to perform bending, lifting, and carrying activities safely and confidently. Appropriate and Ongoing  - Pt to demonstrate ability to independently control and reduce their  pain through posture positioning and mechanical movements throughout a typical day. Appropriate and Ongoing  - Pt will demonstrate reduced pain and improved functional outcomes as reported on the Oswestry Disability Index by reaching a score of 2% or less in order to demonstrate subjective improvement in pt's condition. . Appropriate and Ongoing  - Pt will demonstrate independence with the HEP at discharge. Appropriate and Ongoing  - Pt will strengthen core and back, prevent episodes of pain(patient goal) Appropriate and Ongoing       Plan     Continue with established Plan of Care towards established PT goals.     Therapist: Senia Machuca, PT  10/18/2024

## 2024-10-21 ENCOUNTER — CLINICAL SUPPORT (OUTPATIENT)
Dept: REHABILITATION | Facility: HOSPITAL | Age: 39
End: 2024-10-21
Payer: COMMERCIAL

## 2024-10-21 DIAGNOSIS — M25.69 DECREASED ROM OF TRUNK AND BACK: ICD-10-CM

## 2024-10-21 DIAGNOSIS — R29.898 DECREASED STRENGTH OF TRUNK AND BACK: ICD-10-CM

## 2024-10-21 DIAGNOSIS — M54.50 CHRONIC BILATERAL LOW BACK PAIN WITHOUT SCIATICA: Primary | ICD-10-CM

## 2024-10-21 DIAGNOSIS — G89.29 CHRONIC BILATERAL LOW BACK PAIN WITHOUT SCIATICA: Primary | ICD-10-CM

## 2024-10-21 PROCEDURE — 97750 PHYSICAL PERFORMANCE TEST: CPT | Mod: 32,PO | Performed by: PHYSICAL THERAPIST

## 2024-10-21 NOTE — PATIENT INSTRUCTIONS
HAMSTRING STRETCH WITH TOWEL    While lying down on your back, hook a towel or strap under  your foot and draw up your leg until a stretch is felt along the backside of your leg.     Keep your knee in a straightened position during the stretch. Hold 30 seconds. Repeat 3x on each side.      PIRIFORMIS STRETCH - MODIFIED    While lying on your back, hold your knee with your opposite hand and draw your knee up and over towards your opposite shoulder.Hold 30 seconds. Repeat 3x on each side    Glute Stretch    Lying on your back, cross your right leg over the left so that your right ankle is resting on the top of your left knee. Thread the right arm between the legs and the left arm along the outside of the right knee and grab outside of the left knee and pull towards your body. Hold 30 seconds. Repeat 3x on each side

## 2024-10-21 NOTE — PROGRESS NOTES
[facility]  Physical Therapy Treatment Note     Name: Delma Christianson  Clinic Number: [id number]    Therapy Diagnosis:   Encounter Diagnoses   Name Primary?    Chronic bilateral low back pain without sciatica Yes    Decreased ROM of trunk and back     Decreased strength of trunk and back      Physician: Tameka Fischer PA-C    Visit Date: 10/21/2024    Physician Orders: PT Eval and Treat- Healthy Back  Medical Diagnosis from Referral: chronic back pain, lumbar back pain  Evaluation Date: 10/9/2024  Authorization Period Expiration: 12/31/24  Plan of Care Expiration: 12/18/2024  Reassessment Due: 11/9/2024  Visit # / Visits authorized: 2/20 (+1 prior auth)  MedX testing visit 2  PTA Visit #: 0/5     Time In: 12:55PM  Time Out: 1:55PM  Total Billable Time: 60 minutes  INSURANCE and OUTCOMES: Program Benefit Group with Lumbar Outcomes (Oswestry and AQoL) 1/3    Precautions: standard    Pattern of pain determined: Pattern 1 PEP    Subjective     Delma Christianson reports he feels he can progress more with his exercises in clinic. He has no pain currently, just stiffness.    Patient reports tolerating previous visit without adverse effect.  Patient reports their pain to be 0/10 on a 0-10 scale with 0 being no pain and 10 being the worst pain imaginable.  Pain Location:  low back worse on right      Work and leisure: Occupation: Peds ER MD  Leisure: garden, watch baby, play golf, painting/art  Pt goals: strengthen core and back, prevent episodes of pain    Objective      Lumbar  Isometric Testing on Med X equipment: Testing administered by PT    Test Initial Baseline Midpoint Final   Date 10/18/24     ROM 0-54 deg     Max Peak Torque 227      Min Peak Torque 122      Flex/Ext Ratio 1.9:1     % variance  normative data -30%     % change from initial test N/A visit 1             Outcomes: intake score  OUTCOMES SELECTION:   Program Patient Outcome Measures     Oswestry Score:  3/50 =  6% disability      AQoL Score:  2/36 = 1% disability          Visit 6 Score:   Visit 10 Score:   Discharge Score:  Goal Score: 0%     Treatment     Eidustin received the treatments listed below:      Medical MedX Treatment as follows:  MedX testing performed day 2: Patient  received neuromuscular education to engage spinal musculature correctly for motor control and engagement of musculature for 15 minutes including the MedX exercise component and practice and standard testing. MedX dynamic exercise and baseline isometric test performed with instructions to guide the patient safely through the testing procedure. Patient instructed to perform isometric test correctly and safely while building to an optimal force with a pain-free effort. Patient also instructed that they should feel support/pressure from MedX restraints but no pain/discomfort, and encouraged to report any pain to therapist. Patient demonstrated appropriate understanding of information and tolerance of test.  Education regarding purpose of test, safety during test given, and reviewed possible more soreness and strategies.           10/21/2024     1:37 PM   [facility]   Visit Number 3   VAS Pain Rating 0   Treadmill Time (in min.) 10 min   Extension in Lying 20   Extension in Standing 10   Lumbar Weight 90 lbs   Repetitions 12   Rating of Perceived Exertion 7           Eilan participated in neuromuscular re-education activities to improve balance, coordination, proprioception, motor control and/or posture for 00 minutes. The following activities were included:      Eilan participated in therapeutic exercises to develop strength, endurance, ROM, flexibility, posture, and core stabilization for 45 minutes including:  Prone on elbows 2 min  Press ups 2/10  Bridging 2/10  Standing extension x10  Hamstring stretch 3x 30 sec ea  Piriformis stretch 3x 30 sec ea  Gluteus stretch 3x 30 sec ea      Peripheral muscle strengthening which included one set of  15-20 repetitions at a slow and controlled 10-13 second per rep pace focused on strengthening supporting musculature in order to improve body mechanics and functional mobility. Patient and therapist focused on proper form during treatment to ensure optimal strengthening of each targeted muscle group.  Machines utilized included:Torso rotation, Leg Ext, Leg Curl, Chest Press, Rowing, Triceps, Biceps, Hip Abd, Hip Add, and Leg Press        Eilan participated in dynamic functional therapeutic activities to improve functional performance and simulate household and community activities for 00  minutes. The following activities were included:      Eilan received manual therapy techniques for 00  minutes. The following activities were included:      Pt given cold pack for 10 minutes to low back in [facility].    Patient Education and Home Exercises     Home exercises include:  Prone on elbows 2 min  Press ups 2/10  Bridging 2/10  Standing extension x10  Hamstring stretch  Piriformis stretch  Glute stretch  Cardio program (V5): -  Lifting education (V11): -  Posture/Lumbar roll: instruct visit 1  [employer] Discharge handout (date given): -  Equipment at home/gym membership: no    Education provided:   - PT role and POC  - HEP  - instruct in improved sitting posture, use of lumbar support and frequent extension throughout day    Written Home Exercises Provided: yes.  Exercises were reviewed and Eilan was able to demonstrate them prior to the end of the session.  Eilan demonstrated good  understanding of the education provided.     See EMR under Patient Instructions for exercises provided prior visit.    Assessment     Initiated neuromuscular reeducation on lumbar MEDX at less than 50% of max effort with isometric testing, but pt had high perceived exertion and was unable to complete 15 reps. Plan to decrease resistance next visit and work toward increasing lumbar endurance. Added hip/LE stretching to HEP and pt given written  instructions.    Patient is making good progress towards established goals.  Pt will continue to benefit from skilled outpatient physical therapy to address the deficits stated in the impairment chart, provide pt/family education and to maximize pt's level of independence in the home and community environment.     Anticipated Barriers for therapy: none  Pt's spiritual, cultural and educational needs considered and pt agreeable to plan of care and goals as stated below:     Goals:   Short term goals:  6 weeks or 10 visits   - Pt will demonstrate increased lumbar MedX ROM by at least 3 degrees from the initial ROM value with improvements noted in functional ROM and ability to perform ADLs. Appropriate and Ongoing  - Pt will demonstrate increased MedX average isometric strength value by 20% from initial test resulting in improved ability to perform bending, lifting, and carrying activities safely, confidently. Appropriate and Ongoing  - Pt will report a reduction in worst pain score by 1-2 points for improved tolerance for bending. Appropriate and Ongoing  - Pt able to perform HEP correctly with minimal cueing or supervision from therapist to encourage independent management of symptoms. Appropriate and Ongoing     Long term goals: 10 weeks or 20 visits   - Pt will demonstrate increased lumbar MedX ROM by at least 6 degrees from initial ROM value, resulting in improved ability to perform functional forward bending while standing and sitting. Appropriate and Ongoing  - Pt will demonstrate increased MedX average isometric strength value by 30% from initial test resulting in improved ability to perform bending, lifting, and carrying activities safely and confidently. Appropriate and Ongoing  - Pt to demonstrate ability to independently control and reduce their pain through posture positioning and mechanical movements throughout a typical day. Appropriate and Ongoing  - Pt will demonstrate reduced pain and improved  functional outcomes as reported on the Oswestry Disability Index by reaching a score of 2% or less in order to demonstrate subjective improvement in pt's condition. . Appropriate and Ongoing  - Pt will demonstrate independence with the HEP at discharge. Appropriate and Ongoing  - Pt will strengthen core and back, prevent episodes of pain(patient goal) Appropriate and Ongoing       Plan     Continue with established Plan of Care towards established PT goals.     Therapist: Senia Machuca, PT  10/21/2024

## 2024-10-24 ENCOUNTER — CLINICAL SUPPORT (OUTPATIENT)
Dept: REHABILITATION | Facility: HOSPITAL | Age: 39
End: 2024-10-24
Payer: COMMERCIAL

## 2024-10-24 DIAGNOSIS — G89.29 CHRONIC BILATERAL LOW BACK PAIN WITHOUT SCIATICA: Primary | ICD-10-CM

## 2024-10-24 DIAGNOSIS — M54.50 CHRONIC BILATERAL LOW BACK PAIN WITHOUT SCIATICA: Primary | ICD-10-CM

## 2024-10-24 DIAGNOSIS — R29.898 DECREASED STRENGTH OF TRUNK AND BACK: ICD-10-CM

## 2024-10-24 DIAGNOSIS — M25.69 DECREASED ROM OF TRUNK AND BACK: ICD-10-CM

## 2024-10-24 PROCEDURE — 97750 PHYSICAL PERFORMANCE TEST: CPT | Mod: 32,PO

## 2024-10-24 NOTE — PROGRESS NOTES
[facility]  Physical Therapy Treatment Note     Name: Delma Christianson  Clinic Number: [id number]    Therapy Diagnosis:   Encounter Diagnoses   Name Primary?    Chronic bilateral low back pain without sciatica Yes    Decreased ROM of trunk and back     Decreased strength of trunk and back        Physician: Tameka Fischer PA-C    Visit Date: 10/24/2024    Physician Orders: PT Eval and Treat- Healthy Back  Medical Diagnosis from Referral: chronic back pain, lumbar back pain  Evaluation Date: 10/9/2024  Authorization Period Expiration: 12/31/24  Plan of Care Expiration: 12/18/2024  Reassessment Due: 11/9/2024  Visit # / Visits authorized: 3/20 (+1 prior auth)  MedX testing visit 2  PTA Visit #: 1/5     Time In: 11:00 am  Time Out: 12:02 pm  Total Billable Time: 55 minutes  INSURANCE and OUTCOMES: Program Benefit Group with Lumbar Outcomes (Oswestry and AQoL) 1/3    Precautions: standard    Pattern of pain determined: Pattern 1 PEP    Subjective     Delma Christianson reports he has no LBP pain today. He has been complaint with his HEP at home and work.     Patient reports tolerating previous visit without adverse effect.  Patient reports their pain to be 0/10 on a 0-10 scale with 0 being no pain and 10 being the worst pain imaginable.  Pain Location:  low back worse on right      Work and leisure: Occupation: Peds ER MD  Leisure: garden, watch baby, play golf, painting/art  Pt goals: strengthen core and back, prevent episodes of pain    Objective      Lumbar  Isometric Testing on Med X equipment: Testing administered by PT    Test Initial Baseline Midpoint Final   Date 10/18/24     ROM 0-54 deg     Max Peak Torque 227      Min Peak Torque 122      Flex/Ext Ratio 1.9:1     % variance  normative data -30%     % change from initial test N/A visit 1             Outcomes: intake score  OUTCOMES SELECTION:   Program Patient Outcome Measures     Oswestry Score:  3/50 = 6% disability       AQoL Score:  2/36 = 1% disability          Visit 6 Score:   Visit 10 Score:   Discharge Score:  Goal Score: 0%     Treatment     Eilan received the treatments listed below:      Medical MedX Treatment as follows:  MedX testing performed day 2: Patient  received neuromuscular education to engage spinal musculature correctly for motor control and engagement of musculature for 00 minutes including the MedX exercise component and practice and standard testing. MedX dynamic exercise and baseline isometric test performed with instructions to guide the patient safely through the testing procedure. Patient instructed to perform isometric test correctly and safely while building to an optimal force with a pain-free effort. Patient also instructed that they should feel support/pressure from MedX restraints but no pain/discomfort, and encouraged to report any pain to therapist. Patient demonstrated appropriate understanding of information and tolerance of test.  Education regarding purpose of test, safety during test given, and reviewed possible more soreness and strategies.           MedX exercise started day 2:  Patient received neuromuscular education for 15 minutes via participation on the Medical MedX Machine. Therapist assisted patient in isolating and engaging spinal stabilization musculature in order to improve functional ability and postural control. Patient performed exercise with therapist guidance in order to accurately use pacer function, avoid valsalva, and optimally exert effort within a safe and effective range via the Bianca Exertion Rating Scale. Patient instructed to perform at a midrange of exertion and to complete 15-20 repetitions within appropriate split time, with proper technique, and while maintaining safety.           10/24/2024    11:42 AM   [facility]   Visit Number 4   VAS Pain Rating 0   Treadmill Time (in min.) 10 min   Extension in Lying 20   Extension in Standing 10   Lumbar Weight  80 lbs   Repetitions 15   Rating of Perceived Exertion 6      Eilan participated in neuromuscular re-education activities to improve balance, coordination, proprioception, motor control and/or posture for 00 minutes. The following activities were included:      Eilan participated in therapeutic exercises to develop strength, endurance, ROM, flexibility, posture, and core stabilization for 40 minutes including:  Prone on elbows 2 min  Press ups 2/10  Bridging 2/10  Standing extension x10  Hamstring stretch 3x 30 sec ea  Piriformis stretch 3x 30 sec ea  Gluteus stretch 3x 30 sec ea      Peripheral muscle strengthening which included one set of 15-20 repetitions at a slow and controlled 10-13 second per rep pace focused on strengthening supporting musculature in order to improve body mechanics and functional mobility. Patient and therapist focused on proper form during treatment to ensure optimal strengthening of each targeted muscle group.  Machines utilized included:Torso rotation, Leg Ext, Leg Curl, Chest Press, Rowing, Triceps, Biceps, Hip Abd, Hip Add, and Leg Press        Eilan participated in dynamic functional therapeutic activities to improve functional performance and simulate household and community activities for 00  minutes. The following activities were included:      Eilan received manual therapy techniques for 00  minutes. The following activities were included:      Pt given cold pack for 00 minutes to low back in [facility].    Patient Education and Home Exercises     Home exercises include:  Prone on elbows 2 min  Press ups 2/10  Bridging 2/10  Standing extension x10  Hamstring stretch  Piriformis stretch  Glute stretch  Cardio program (V5): -  Lifting education (V11): -  Posture/Lumbar roll: instruct visit 1  [employer] Discharge handout (date given): -  Equipment at home/gym membership: no    Education provided:   - PT role and POC  - HEP  - instruct in improved sitting posture, use of lumbar support  and frequent extension throughout day    Written Home Exercises Provided: yes.  Exercises were reviewed and Delma was able to demonstrate them prior to the end of the session.  Eilan demonstrated good  understanding of the education provided.     See EMR under Patient Instructions for exercises provided prior visit.    Assessment   Decreased lumbar weight due weight difficult last session. He tolerated weight better decreased, but still a little difficult. No c/o pain with session. Pt is complaint with his HEP. He does not get pain daily but he does want to get stronger and know what to do if he gets pain.  Patient is making good progress towards established goals.  Pt will continue to benefit from skilled outpatient physical therapy to address the deficits stated in the impairment chart, provide pt/family education and to maximize pt's level of independence in the home and community environment.     Anticipated Barriers for therapy: none  Pt's spiritual, cultural and educational needs considered and pt agreeable to plan of care and goals as stated below:     Goals:   Short term goals:  6 weeks or 10 visits   - Pt will demonstrate increased lumbar MedX ROM by at least 3 degrees from the initial ROM value with improvements noted in functional ROM and ability to perform ADLs. Appropriate and Ongoing  - Pt will demonstrate increased MedX average isometric strength value by 20% from initial test resulting in improved ability to perform bending, lifting, and carrying activities safely, confidently. Appropriate and Ongoing  - Pt will report a reduction in worst pain score by 1-2 points for improved tolerance for bending. Appropriate and Ongoing  - Pt able to perform HEP correctly with minimal cueing or supervision from therapist to encourage independent management of symptoms. Appropriate and Ongoing     Long term goals: 10 weeks or 20 visits   - Pt will demonstrate increased lumbar MedX ROM by at least 6 degrees from  initial ROM value, resulting in improved ability to perform functional forward bending while standing and sitting. Appropriate and Ongoing  - Pt will demonstrate increased MedX average isometric strength value by 30% from initial test resulting in improved ability to perform bending, lifting, and carrying activities safely and confidently. Appropriate and Ongoing  - Pt to demonstrate ability to independently control and reduce their pain through posture positioning and mechanical movements throughout a typical day. Appropriate and Ongoing  - Pt will demonstrate reduced pain and improved functional outcomes as reported on the Oswestry Disability Index by reaching a score of 2% or less in order to demonstrate subjective improvement in pt's condition. . Appropriate and Ongoing    - Pt will demonstrate independence with the HEP at discharge. Appropriate and Ongoing  - Pt will strengthen core and back, prevent episodes of pain(patient goal) Appropriate and Ongoing       Plan     Continue with established Plan of Care towards established PT goals.     Therapist: Laurie Perez, ANTONIO  10/24/2024

## 2024-10-28 ENCOUNTER — CLINICAL SUPPORT (OUTPATIENT)
Dept: REHABILITATION | Facility: HOSPITAL | Age: 39
End: 2024-10-28
Payer: COMMERCIAL

## 2024-10-28 DIAGNOSIS — G89.29 CHRONIC BILATERAL LOW BACK PAIN WITHOUT SCIATICA: Primary | ICD-10-CM

## 2024-10-28 DIAGNOSIS — R29.898 DECREASED STRENGTH OF TRUNK AND BACK: ICD-10-CM

## 2024-10-28 DIAGNOSIS — M25.69 DECREASED ROM OF TRUNK AND BACK: ICD-10-CM

## 2024-10-28 DIAGNOSIS — M54.50 CHRONIC BILATERAL LOW BACK PAIN WITHOUT SCIATICA: Primary | ICD-10-CM

## 2024-10-28 PROCEDURE — 97750 PHYSICAL PERFORMANCE TEST: CPT | Mod: 32,PO | Performed by: PHYSICAL THERAPIST

## 2024-11-01 ENCOUNTER — CLINICAL SUPPORT (OUTPATIENT)
Dept: REHABILITATION | Facility: HOSPITAL | Age: 39
End: 2024-11-01
Payer: COMMERCIAL

## 2024-11-01 DIAGNOSIS — M25.69 DECREASED ROM OF TRUNK AND BACK: ICD-10-CM

## 2024-11-01 DIAGNOSIS — M54.50 CHRONIC BILATERAL LOW BACK PAIN WITHOUT SCIATICA: Primary | ICD-10-CM

## 2024-11-01 DIAGNOSIS — G89.29 CHRONIC BILATERAL LOW BACK PAIN WITHOUT SCIATICA: Primary | ICD-10-CM

## 2024-11-01 DIAGNOSIS — R29.898 DECREASED STRENGTH OF TRUNK AND BACK: ICD-10-CM

## 2024-11-01 PROCEDURE — 97750 PHYSICAL PERFORMANCE TEST: CPT | Mod: 32,PO

## 2024-11-05 ENCOUNTER — CLINICAL SUPPORT (OUTPATIENT)
Dept: REHABILITATION | Facility: HOSPITAL | Age: 39
End: 2024-11-05
Payer: COMMERCIAL

## 2024-11-05 DIAGNOSIS — R29.898 DECREASED STRENGTH OF TRUNK AND BACK: ICD-10-CM

## 2024-11-05 DIAGNOSIS — G89.29 CHRONIC BILATERAL LOW BACK PAIN WITHOUT SCIATICA: Primary | ICD-10-CM

## 2024-11-05 DIAGNOSIS — M54.50 CHRONIC BILATERAL LOW BACK PAIN WITHOUT SCIATICA: Primary | ICD-10-CM

## 2024-11-05 DIAGNOSIS — M25.69 DECREASED ROM OF TRUNK AND BACK: ICD-10-CM

## 2024-11-05 PROCEDURE — 97750 PHYSICAL PERFORMANCE TEST: CPT | Mod: 32,PO

## 2024-11-05 NOTE — PROGRESS NOTES
[facility]  Physical Therapy Treatment Note     Name: Delma Christianson  Clinic Number: [id number]    Therapy Diagnosis:   Encounter Diagnoses   Name Primary?    Chronic bilateral low back pain without sciatica Yes    Decreased ROM of trunk and back     Decreased strength of trunk and back          Physician: Tameka Fischer PA-C    Visit Date: 11/5/2024    Physician Orders: PT Eval and Treat- Healthy Back  Medical Diagnosis from Referral: chronic back pain, lumbar back pain  Evaluation Date: 10/9/2024  Authorization Period Expiration: 12/31/24  Plan of Care Expiration: 12/18/2024  Reassessment Due: 11/9/2024  Visit # / Visits authorized: 5/20 (+1 prior auth)  MedX testing visit 2  PTA Visit #: 1/5     Time In: 11:00 am  Time Out: 12:08 pm  Total Billable Time: 60 minutes  INSURANCE and OUTCOMES: Program Benefit Group with Lumbar Outcomes (Oswestry and AQoL) 1/3    Precautions: standard    Pattern of pain determined: Pattern 1 PEP    Subjective     Delma Christianson  his LB has been sore post session.  Patient reports tolerating previous visit without adverse effect.  Patient reports their pain to be 0/10 on a 0-10 scale with 0 being no pain and 10 being the worst pain imaginable.  Pain Location:  low back worse on right      Work and leisure: Occupation: Peds ER MD  Leisure: garden, watch baby, play golf, painting/art  Pt goals: strengthen core and back, prevent episodes of pain    Objective      Lumbar  Isometric Testing on Med X equipment: Testing administered by PT    Test Initial Baseline Midpoint Final   Date 10/18/24     ROM 0-54 deg     Max Peak Torque 227      Min Peak Torque 122      Flex/Ext Ratio 1.9:1     % variance  normative data -30%     % change from initial test N/A visit 1             Outcomes: intake score  OUTCOMES SELECTION:   Program Patient Outcome Measures     Oswestry Score:  3/50 = 6% disability      AQoL Score:  2/36 = 1% disability           Visit 6 Score:   Visit 10 Score:   Discharge Score:  Goal Score: 0%     Treatment     Eidustin received the treatments listed below:      Medical MedX Treatment as follows:    MedX exercise started day 2:  Patient received neuromuscular education for 15 minutes via participation on the Medical MedX Machine. Therapist assisted patient in isolating and engaging spinal stabilization musculature in order to improve functional ability and postural control. Patient performed exercise with therapist guidance in order to accurately use pacer function, avoid valsalva, and optimally exert effort within a safe and effective range via the Bianca Exertion Rating Scale. Patient instructed to perform at a midrange of exertion and to complete 15-20 repetitions within appropriate split time, with proper technique, and while maintaining safety.           11/5/2024    11:43 AM   [facility]   Visit Number 7   VAS Pain Rating 0   Treadmill Time (in min.) 10 min jogging   Speed 3 mph   Extension in Lying 20   Extension in Standing 10   Lumbar Weight 60 lbs   Repetitions 20   Rating of Perceived Exertion 4   Ice - Z Lie (in min.) 10          Eidustin participated in neuromuscular re-education activities to improve balance, coordination, proprioception, motor control and/or posture for 00 minutes. The following activities were included:      Eilan participated in therapeutic exercises to develop strength, endurance, ROM, flexibility, posture, and core stabilization for 45 minutes including:   Prone on elbows 2 min  Press ups 2/10  Bridging 2/10  Standing extension x10  Hamstring stretch 3x 30 sec ea  Piriformis stretch 3x 30 sec ea  Gluteus stretch 3x 30 sec ea      Peripheral muscle strengthening which included one set of 15-20 repetitions at a slow and controlled 10-13 second per rep pace focused on strengthening supporting musculature in order to improve body mechanics and functional mobility. Patient and therapist focused on  proper form during treatment to ensure optimal strengthening of each targeted muscle group.  Machines utilized included:Torso rotation, Leg Ext, Leg Curl, Chest Press, Rowing, Triceps, Biceps, Hip Abd, Hip Add, and Leg Press        Eilan participated in dynamic functional therapeutic activities to improve functional performance and simulate household and community activities for 00  minutes. The following activities were included:      Eilan received manual therapy techniques for 00  minutes. The following activities were included:      Pt given cold pack for 00 minutes to low back in Z-lie.    Patient Education and Home Exercises     Home exercises include:  Prone on elbows 2 min  Press ups 2/10  Bridging 2/10  Standing extension x10  Hamstring stretch  Piriformis stretch  Glute stretch  Cardio program (V5): - 10/28/24  Lifting education (V11): -  Posture/Lumbar roll: instruct visit 1  [employer] Discharge handout (date given): -  Equipment at home/gym membership: no    Education provided:   - PT role and POC  - HEP  - instruct in improved sitting posture, use of lumbar support and frequent extension throughout day    Written Home Exercises Provided: yes.  Exercises were reviewed and Delma was able to demonstrate them prior to the end of the session.  Eilan demonstrated good  understanding of the education provided.     See EMR under Patient Instructions for exercises provided prior visit.    Assessment     Reduced pt's weight on lumbar medx due to pain and spasms of LB last visit. He did tolerate weight better with minimal fatigue, but no spasm. He should be able to increase weight next visit. Monitor response. No c/o LBP post session.     Patient is making good progress towards established goals.  Pt will continue to benefit from skilled outpatient physical therapy to address the deficits stated in the impairment chart, provide pt/family education and to maximize pt's level of independence in the home and  community environment.     Anticipated Barriers for therapy: none  Pt's spiritual, cultural and educational needs considered and pt agreeable to plan of care and goals as stated below:     Goals:   Short term goals:  6 weeks or 10 visits   - Pt will demonstrate increased lumbar MedX ROM by at least 3 degrees from the initial ROM value with improvements noted in functional ROM and ability to perform ADLs. Appropriate and Ongoing  - Pt will demonstrate increased MedX average isometric strength value by 20% from initial test resulting in improved ability to perform bending, lifting, and carrying activities safely, confidently. Appropriate and Ongoing  - Pt will report a reduction in worst pain score by 1-2 points for improved tolerance for bending. Appropriate and Ongoing  - Pt able to perform HEP correctly with minimal cueing or supervision from therapist to encourage independent management of symptoms. Appropriate and Ongoing     Long term goals: 10 weeks or 20 visits   - Pt will demonstrate increased lumbar MedX ROM by at least 6 degrees from initial ROM value, resulting in improved ability to perform functional forward bending while standing and sitting. Appropriate and Ongoing  - Pt will demonstrate increased MedX average isometric strength value by 30% from initial test resulting in improved ability to perform bending, lifting, and carrying activities safely and confidently. Appropriate and Ongoing  - Pt to demonstrate ability to independently control and reduce their pain through posture positioning and mechanical movements throughout a typical day. Appropriate and Ongoing  - Pt will demonstrate reduced pain and improved functional outcomes as reported on the Oswestry Disability Index by reaching a score of 2% or less in order to demonstrate subjective improvement in pt's condition. . Appropriate and Ongoing    - Pt will demonstrate independence with the HEP at discharge. Appropriate and Ongoing  - Pt will  strengthen core and back, prevent episodes of pain(patient goal) Appropriate and Ongoing       Plan     Continue with established Plan of Care towards established PT goals.     Therapist: Laurie Perez, PTA  11/5/2024

## 2024-11-08 ENCOUNTER — CLINICAL SUPPORT (OUTPATIENT)
Dept: REHABILITATION | Facility: HOSPITAL | Age: 39
End: 2024-11-08
Payer: COMMERCIAL

## 2024-11-08 DIAGNOSIS — R29.898 DECREASED STRENGTH OF TRUNK AND BACK: ICD-10-CM

## 2024-11-08 DIAGNOSIS — G89.29 CHRONIC BILATERAL LOW BACK PAIN WITHOUT SCIATICA: Primary | ICD-10-CM

## 2024-11-08 DIAGNOSIS — M54.50 CHRONIC BILATERAL LOW BACK PAIN WITHOUT SCIATICA: Primary | ICD-10-CM

## 2024-11-08 DIAGNOSIS — M25.69 DECREASED ROM OF TRUNK AND BACK: ICD-10-CM

## 2024-11-08 PROCEDURE — 97750 PHYSICAL PERFORMANCE TEST: CPT | Mod: 32,PO | Performed by: PHYSICAL THERAPIST

## 2024-11-08 NOTE — PROGRESS NOTES
[facility]  Physical Therapy Treatment Note     Name: Delma Christianson  Clinic Number: [id number]    Therapy Diagnosis:   Encounter Diagnoses   Name Primary?    Chronic bilateral low back pain without sciatica Yes    Decreased ROM of trunk and back     Decreased strength of trunk and back            Physician: Tameka Fischer PA-C    Visit Date: 11/8/2024    Physician Orders: PT Eval and Treat- Healthy Back  Medical Diagnosis from Referral: chronic back pain, lumbar back pain  Evaluation Date: 10/9/2024  Authorization Period Expiration: 12/31/24  Plan of Care Expiration: 12/18/2024  Reassessment Due: 11/9/2024  Visit # / Visits authorized: 7/20 (+1 prior auth)  MedX testing visit 2  PTA Visit #: 0/5     Time In: 11:00 am  Time Out: 12:05 pm  Total Billable Time: 55 minutes  INSURANCE and OUTCOMES: Program Benefit Group with Lumbar Outcomes (Oswestry and AQoL) 1/3    Precautions: standard    Pattern of pain determined: Pattern 1 PEP    Subjective     Delma Christianson  his LBP was better with reduction in resistance on MEDX last visit. He has no pain currently.  Patient reports tolerating previous visit without adverse effect.  Patient reports their pain to be 0/10 on a 0-10 scale with 0 being no pain and 10 being the worst pain imaginable.  Pain Location:  low back worse on right      Work and leisure: Occupation: Peds ER MD  Leisure: garden, watch baby, play golf, painting/art  Pt goals: strengthen core and back, prevent episodes of pain    Objective      Lumbar  Isometric Testing on Med X equipment: Testing administered by PT    Test Initial Baseline Midpoint Final   Date 10/18/24     ROM 0-54 deg     Max Peak Torque 227      Min Peak Torque 122      Flex/Ext Ratio 1.9:1     % variance  normative data -30%     % change from initial test N/A visit 1             Outcomes: intake score  OUTCOMES SELECTION:   Program Patient Outcome Measures     Oswestry Score:  3/50 = 6%  disability      AQoL Score:  2/36 = 1% disability          Visit 6 Score:   Visit 10 Score:   Discharge Score:  Goal Score: 0%     Treatment     Eidustin received the treatments listed below:      Medical MedX Treatment as follows:    MedX exercise started day 2:  Patient received neuromuscular education for 15 minutes via participation on the Medical MedX Machine. Therapist assisted patient in isolating and engaging spinal stabilization musculature in order to improve functional ability and postural control. Patient performed exercise with therapist guidance in order to accurately use pacer function, avoid valsalva, and optimally exert effort within a safe and effective range via the Bianca Exertion Rating Scale. Patient instructed to perform at a midrange of exertion and to complete 15-20 repetitions within appropriate split time, with proper technique, and while maintaining safety.           11/8/2024    10:11 AM   [facility]   Visit Number 8   VAS Pain Rating 0   Treadmill Time (in min.) 10 min   Extension in Lying 20   Extension in Standing 10   Lumbar Weight 60 lbs   Repetitions 20   Rating of Perceived Exertion 4   Ice - Z Lie (in min.) 10           Eidustin participated in neuromuscular re-education activities to improve balance, coordination, proprioception, motor control and/or posture for 00 minutes. The following activities were included:      Eidustin participated in therapeutic exercises to develop strength, endurance, ROM, flexibility, posture, and core stabilization for 45 minutes including:   Prone on elbows 2 min  Press ups 2/10  Bridging 2/10  Standing extension x10  Hamstring stretch 3x 30 sec ea  Piriformis stretch 3x 30 sec ea  Gluteus stretch 3x 30 sec ea      Peripheral muscle strengthening which included one set of 15-20 repetitions at a slow and controlled 10-13 second per rep pace focused on strengthening supporting musculature in order to improve body mechanics and functional mobility.  Patient and therapist focused on proper form during treatment to ensure optimal strengthening of each targeted muscle group.  Machines utilized included:Torso rotation, Leg Ext, Leg Curl, Chest Press, Rowing, Triceps, Biceps, Hip Abd, Hip Add, and Leg Press      Eidustin participated in dynamic functional therapeutic activities to improve functional performance and simulate household and community activities for 00  minutes. The following activities were included:      Eidutsin received manual therapy techniques for 00  minutes. The following activities were included:      Pt given cold pack for 10 minutes to low back in Z-lie.    Patient Education and Home Exercises     Home exercises include:  Prone on elbows 2 min  Press ups 2/10  Bridging 2/10  Standing extension x10  Hamstring stretch  Piriformis stretch  Glute stretch  Cardio program (V5): - 10/28/24  Lifting education (V11): -  Posture/Lumbar roll: instruct visit 1  [employer] Discharge handout (date given): -  Equipment at home/gym membership: no    Education provided:   - PT role and POC  - HEP  - instruct in improved sitting posture, use of lumbar support and frequent extension throughout day    Written Home Exercises Provided: yes.  Exercises were reviewed and Delma was able to demonstrate them prior to the end of the session.  Eidustin demonstrated good  understanding of the education provided.     See EMR under Patient Instructions for exercises provided prior visit.    Assessment     Delma tolerated reduction on MEDX without pain by end of session. Remained at this level with appropriate perceived exertion. Should be able to increase 5% next visit. Several peripheral exercises at challenging level. He is doing some cardio on his own at home.   Patient is making good progress towards established goals.  Pt will continue to benefit from skilled outpatient physical therapy to address the deficits stated in the impairment chart, provide pt/family education and  to maximize pt's level of independence in the home and community environment.     Anticipated Barriers for therapy: none  Pt's spiritual, cultural and educational needs considered and pt agreeable to plan of care and goals as stated below:     Goals:   Short term goals:  6 weeks or 10 visits   - Pt will demonstrate increased lumbar MedX ROM by at least 3 degrees from the initial ROM value with improvements noted in functional ROM and ability to perform ADLs. Appropriate and Ongoing  - Pt will demonstrate increased MedX average isometric strength value by 20% from initial test resulting in improved ability to perform bending, lifting, and carrying activities safely, confidently. Appropriate and Ongoing  - Pt will report a reduction in worst pain score by 1-2 points for improved tolerance for bending. Appropriate and Ongoing  - Pt able to perform HEP correctly with minimal cueing or supervision from therapist to encourage independent management of symptoms. Appropriate and Ongoing     Long term goals: 10 weeks or 20 visits   - Pt will demonstrate increased lumbar MedX ROM by at least 6 degrees from initial ROM value, resulting in improved ability to perform functional forward bending while standing and sitting. Appropriate and Ongoing  - Pt will demonstrate increased MedX average isometric strength value by 30% from initial test resulting in improved ability to perform bending, lifting, and carrying activities safely and confidently. Appropriate and Ongoing  - Pt to demonstrate ability to independently control and reduce their pain through posture positioning and mechanical movements throughout a typical day. Appropriate and Ongoing  - Pt will demonstrate reduced pain and improved functional outcomes as reported on the Oswestry Disability Index by reaching a score of 2% or less in order to demonstrate subjective improvement in pt's condition. . Appropriate and Ongoing    - Pt will demonstrate independence with the HEP  at discharge. Appropriate and Ongoing  - Pt will strengthen core and back, prevent episodes of pain(patient goal) Appropriate and Ongoing       Plan     Continue with established Plan of Care towards established PT goals.     Therapist: Senia Machuca, PT  11/8/2024

## 2024-11-12 ENCOUNTER — CLINICAL SUPPORT (OUTPATIENT)
Dept: REHABILITATION | Facility: HOSPITAL | Age: 39
End: 2024-11-12
Payer: COMMERCIAL

## 2024-11-12 DIAGNOSIS — R29.898 DECREASED STRENGTH OF TRUNK AND BACK: ICD-10-CM

## 2024-11-12 DIAGNOSIS — M25.69 DECREASED ROM OF TRUNK AND BACK: ICD-10-CM

## 2024-11-12 DIAGNOSIS — G89.29 CHRONIC BILATERAL LOW BACK PAIN WITHOUT SCIATICA: Primary | ICD-10-CM

## 2024-11-12 DIAGNOSIS — M54.50 CHRONIC BILATERAL LOW BACK PAIN WITHOUT SCIATICA: Primary | ICD-10-CM

## 2024-11-12 PROCEDURE — 97110 THERAPEUTIC EXERCISES: CPT | Mod: PO,CQ

## 2024-11-12 PROCEDURE — 97750 PHYSICAL PERFORMANCE TEST: CPT | Mod: 32,PO

## 2024-11-12 PROCEDURE — 97112 NEUROMUSCULAR REEDUCATION: CPT | Mod: PO,CQ

## 2024-11-12 NOTE — PROGRESS NOTES
[facility]  Physical Therapy Treatment Note     Name: Delma Christianson  Clinic Number: [id number]    Therapy Diagnosis:   Encounter Diagnoses   Name Primary?    Chronic bilateral low back pain without sciatica Yes    Decreased ROM of trunk and back     Decreased strength of trunk and back        Physician: Tameka Fischer PA-C    Visit Date: 11/12/2024    Physician Orders: PT Eval and Treat- Healthy Back  Medical Diagnosis from Referral: chronic back pain, lumbar back pain  Evaluation Date: 10/9/2024  Authorization Period Expiration: 12/31/24  Plan of Care Expiration: 12/18/2024  Reassessment Due: 11/9/2024  Visit # / Visits authorized: 8/20 (+1 prior auth)  MedX testing visit 2  PTA Visit #: 1/5     Time In: 11:00 am  Time Out: 12:10 pm  Total Billable Time: 60 minutes  INSURANCE and OUTCOMES: Program Benefit Group with Lumbar Outcomes (Oswestry and AQoL) 1/3    Precautions: standard    Pattern of pain determined: Pattern 1 PEP    Subjective     Delma Christianson  stated his LB is better.   Patient reports tolerating previous visit without adverse effect.  Patient reports their pain to be 0/10 on a 0-10 scale with 0 being no pain and 10 being the worst pain imaginable.  Pain Location:  low back worse on right      Work and leisure: Occupation: Peds ER MD  Leisure: garden, watch baby, play golf, painting/art  Pt goals: strengthen core and back, prevent episodes of pain    Objective      Lumbar  Isometric Testing on Med X equipment: Testing administered by PT    Test Initial Baseline Midpoint Final   Date 10/18/24     ROM 0-54 deg     Max Peak Torque 227      Min Peak Torque 122      Flex/Ext Ratio 1.9:1     % variance  normative data -30%     % change from initial test N/A visit 1             Outcomes: intake score  OUTCOMES SELECTION:   Program Patient Outcome Measures     Oswestry Score:  3/50 = 6% disability      AQoL Score:  2/36 = 1% disability          Visit 6  Score:   Visit 10 Score:   Discharge Score:  Goal Score: 0%     Treatment     Eidustin received the treatments listed below:      Medical MedX Treatment as follows:    MedX exercise started day 2:  Patient received neuromuscular education for 15 minutes via participation on the Medical MedX Machine. Therapist assisted patient in isolating and engaging spinal stabilization musculature in order to improve functional ability and postural control. Patient performed exercise with therapist guidance in order to accurately use pacer function, avoid valsalva, and optimally exert effort within a safe and effective range via the Bianca Exertion Rating Scale. Patient instructed to perform at a midrange of exertion and to complete 15-20 repetitions within appropriate split time, with proper technique, and while maintaining safety.           11/12/2024     1:26 PM   [facility]   Visit Number 9   VAS Pain Rating 0   Treadmill Time (in min.) 10 min   Extension in Lying 20   Extension in Standing 10   Lumbar Weight 63 lbs   Repetitions 19   Rating of Perceived Exertion 5   Ice - Z Lie (in min.) 10            Eidustin participated in neuromuscular re-education activities to improve balance, coordination, proprioception, motor control and/or posture for 00 minutes. The following activities were included:      Eidustin participated in therapeutic exercises to develop strength, endurance, ROM, flexibility, posture, and core stabilization for 45 minutes including:   Prone on elbows 2 min  Press ups 2/10  Bridging 2/10  Standing extension x10  Hamstring stretch 3x 30 sec ea  Piriformis stretch 3x 30 sec ea  Gluteus stretch 3x 30 sec ea      Peripheral muscle strengthening which included one set of 15-20 repetitions at a slow and controlled 10-13 second per rep pace focused on strengthening supporting musculature in order to improve body mechanics and functional mobility. Patient and therapist focused on proper form during treatment to  ensure optimal strengthening of each targeted muscle group.  Machines utilized included:Torso rotation, Leg Ext, Leg Curl, Chest Press, Rowing, Triceps, Biceps, Hip Abd, Hip Add, and Leg Press      Eidustin participated in dynamic functional therapeutic activities to improve functional performance and simulate household and community activities for 00  minutes. The following activities were included:      Eidustin received manual therapy techniques for 00  minutes. The following activities were included:      Pt given cold pack for 10 minutes to low back in Z-lie.    Patient Education and Home Exercises     Home exercises include:  Prone on elbows 2 min  Press ups 2/10  Bridging 2/10  Standing extension x10  Hamstring stretch  Piriformis stretch  Glute stretch  Cardio program (V5): - 10/28/24  Lifting education (V11): -  Posture/Lumbar roll: instruct visit 1  [employer] Discharge handout (date given): -  Equipment at home/gym membership: no    Education provided:   - PT role and POC  - HEP  - instruct in improved sitting posture, use of lumbar support and frequent extension throughout day    Written Home Exercises Provided: yes.  Exercises were reviewed and Delma was able to demonstrate them prior to the end of the session.  Eidustin demonstrated good  understanding of the education provided.     See EMR under Patient Instructions for exercises provided prior visit.    Assessment     Eidustin able to progress with weight today with minimal c/o LBP and no adverse affect. Assess DOMS next session if any. Several peripheral exercises at challenging level. He has been feeling better and complaint with his HEP.   Patient is making good progress towards established goals.  Pt will continue to benefit from skilled outpatient physical therapy to address the deficits stated in the impairment chart, provide pt/family education and to maximize pt's level of independence in the home and community environment.     Anticipated Barriers  for therapy: none  Pt's spiritual, cultural and educational needs considered and pt agreeable to plan of care and goals as stated below:     Goals:   Short term goals:  6 weeks or 10 visits   - Pt will demonstrate increased lumbar MedX ROM by at least 3 degrees from the initial ROM value with improvements noted in functional ROM and ability to perform ADLs. Appropriate and Ongoing  - Pt will demonstrate increased MedX average isometric strength value by 20% from initial test resulting in improved ability to perform bending, lifting, and carrying activities safely, confidently. Appropriate and Ongoing  - Pt will report a reduction in worst pain score by 1-2 points for improved tolerance for bending. Appropriate and Ongoing  - Pt able to perform HEP correctly with minimal cueing or supervision from therapist to encourage independent management of symptoms. Appropriate and Ongoing     Long term goals: 10 weeks or 20 visits   - Pt will demonstrate increased lumbar MedX ROM by at least 6 degrees from initial ROM value, resulting in improved ability to perform functional forward bending while standing and sitting. Appropriate and Ongoing  - Pt will demonstrate increased MedX average isometric strength value by 30% from initial test resulting in improved ability to perform bending, lifting, and carrying activities safely and confidently. Appropriate and Ongoing  - Pt to demonstrate ability to independently control and reduce their pain through posture positioning and mechanical movements throughout a typical day. Appropriate and Ongoing  - Pt will demonstrate reduced pain and improved functional outcomes as reported on the Oswestry Disability Index by reaching a score of 2% or less in order to demonstrate subjective improvement in pt's condition. . Appropriate and Ongoing    - Pt will demonstrate independence with the HEP at discharge. Appropriate and Ongoing  - Pt will strengthen core and back, prevent episodes of  pain(patient goal) Appropriate and Ongoing       Plan     Continue with established Plan of Care towards established PT goals.     Therapist: Laurie Perez, PTA  11/12/2024

## 2024-11-13 ENCOUNTER — OFFICE VISIT (OUTPATIENT)
Dept: DERMATOLOGY | Facility: CLINIC | Age: 39
End: 2024-11-13
Payer: COMMERCIAL

## 2024-11-13 DIAGNOSIS — L81.4 LENTIGINES: ICD-10-CM

## 2024-11-13 DIAGNOSIS — Z12.83 SCREENING EXAM FOR SKIN CANCER: ICD-10-CM

## 2024-11-13 DIAGNOSIS — D22.9 MULTIPLE BENIGN NEVI: Primary | ICD-10-CM

## 2024-11-13 DIAGNOSIS — L82.1 SEBORRHEIC KERATOSES: ICD-10-CM

## 2024-11-13 PROBLEM — Z00.00 ENCOUNTER FOR SCREENING AND PREVENTATIVE CARE: Status: ACTIVE | Noted: 2024-11-13

## 2024-11-13 PROCEDURE — 99203 OFFICE O/P NEW LOW 30 MIN: CPT | Mod: S$GLB,,, | Performed by: STUDENT IN AN ORGANIZED HEALTH CARE EDUCATION/TRAINING PROGRAM

## 2024-11-13 PROCEDURE — 1160F RVW MEDS BY RX/DR IN RCRD: CPT | Mod: CPTII,S$GLB,, | Performed by: STUDENT IN AN ORGANIZED HEALTH CARE EDUCATION/TRAINING PROGRAM

## 2024-11-13 PROCEDURE — 99999 PR PBB SHADOW E&M-EST. PATIENT-LVL II: CPT | Mod: PBBFAC,,, | Performed by: STUDENT IN AN ORGANIZED HEALTH CARE EDUCATION/TRAINING PROGRAM

## 2024-11-13 PROCEDURE — 3044F HG A1C LEVEL LT 7.0%: CPT | Mod: CPTII,S$GLB,, | Performed by: STUDENT IN AN ORGANIZED HEALTH CARE EDUCATION/TRAINING PROGRAM

## 2024-11-13 PROCEDURE — 1159F MED LIST DOCD IN RCRD: CPT | Mod: CPTII,S$GLB,, | Performed by: STUDENT IN AN ORGANIZED HEALTH CARE EDUCATION/TRAINING PROGRAM

## 2024-11-13 NOTE — PROGRESS NOTES
Subjective:      Patient ID:  Delma Christianson is a 39 y.o. male who presents for No chief complaint on file.    Delma Christianson is a 39 y.o. male who presents for: FBSE screening exam for skin cancer.    New patient    The patient has the following lesions of concern:  Location: back   Duration: years   Symptoms: bleeds sometimes / gotten bigger   Relieving factors/Previous treatments: none     Patient with new area of concern:   Location: back of scalp   Previous treatments: none     Pertinent history:  History of blistering sunburns: No  History of tanning bed use: No  Family history of melanoma: No  Personal history of mole removal: No  Personal history of skin cancer: No     Social hx: pt is pediatric ER doctor @ [employer]       Review of Systems   Skin:  Positive for activity-related sunscreen use. Negative for daily sunscreen use and recent sunburn.   Hematologic/Lymphatic: Does not bruise/bleed easily.       Objective:   Physical Exam   Constitutional: He appears well-developed and well-nourished. No distress.   Neurological: He is alert and oriented to person, place, and time. He is not disoriented.   Psychiatric: He has a normal mood and affect.   Skin:   Areas Examined (abnormalities noted in diagram):   Scalp / Hair Palpated and Inspected  Head / Face Inspection Performed  Neck Inspection Performed  Chest / Axilla Inspection Performed  Abdomen Inspection Performed  Genitals / Buttocks / Groin Inspection Performed  Back Inspection Performed  RUE Inspected  LUE Inspection Performed  RLE Inspected  LLE Inspection Performed  Nails and Digits Inspection Performed            Diagram Legend     Erythematous scaling macule/papule c/w actinic keratosis       Vascular papule c/w angioma      Pigmented verrucoid papule/plaque c/w seborrheic keratosis      Yellow umbilicated papule c/w sebaceous hyperplasia      Irregularly shaped tan macule c/w lentigo     1-2 mm smooth white papules consistent with Milia       Movable subcutaneous cyst with punctum c/w epidermal inclusion cyst      Subcutaneous movable cyst c/w pilar cyst      Firm pink to brown papule c/w dermatofibroma      Pedunculated fleshy papule(s) c/w skin tag(s)      Evenly pigmented macule c/w junctional nevus     Mildly variegated pigmented, slightly irregular-bordered macule c/w mildly atypical nevus      Flesh colored to evenly pigmented papule c/w intradermal nevus       Pink pearly papule/plaque c/w basal cell carcinoma      Erythematous hyperkeratotic cursted plaque c/w SCC      Surgical scar with no sign of skin cancer recurrence      Open and closed comedones      Inflammatory papules and pustules      Verrucoid papule consistent consistent with wart     Erythematous eczematous patches and plaques     Dystrophic onycholytic nail with subungual debris c/w onychomycosis     Umbilicated papule    Erythematous-base heme-crusted tan verrucoid plaque consistent with inflamed seborrheic keratosis     Erythematous Silvery Scaling Plaque c/w Psoriasis     See annotation      Assessment / Plan:        Multiple benign nevi  Reassurance benign  Examined with dermoscopy  A few > 6 mm denoted  I reviewed the ABCDE's of melanoma, ugly duckling sign and importance of monthly self-exams in mirror.     Lentigines  Diffuse  This is a benign hyperpigmented sun induced lesion. Recommend daily sun protection/avoidance and use of at least SPF 30, broad spectrum sunscreen (OTC drug) will reduce the number of new lesions. Treatment of these benign lesions are considered cosmetic.  UPF50 sun shirts recommended    Seborrheic keratoses  These are benign inherited growths without a malignant potential. Reassurance given to patient. No treatment is necessary.     Screening exam skin cancer  Total body skin examination performed today including at least 12 points as noted in physical examination. No lesions suspicious for malignancy noted.    Recommend daily sun protection/avoidance,  use of at least SPF 30, broad spectrum sunscreen (OTC drug), skin self examinations, and routine physician surveillance to optimize early detection    Back lesion has resolved, not present on exam today    RTC 1 yr, sooner prn

## 2024-11-19 ENCOUNTER — CLINICAL SUPPORT (OUTPATIENT)
Dept: REHABILITATION | Facility: HOSPITAL | Age: 39
End: 2024-11-19
Payer: COMMERCIAL

## 2024-11-19 DIAGNOSIS — G89.29 CHRONIC BILATERAL LOW BACK PAIN WITHOUT SCIATICA: Primary | ICD-10-CM

## 2024-11-19 DIAGNOSIS — M54.50 CHRONIC BILATERAL LOW BACK PAIN WITHOUT SCIATICA: Primary | ICD-10-CM

## 2024-11-19 DIAGNOSIS — M25.69 DECREASED ROM OF TRUNK AND BACK: ICD-10-CM

## 2024-11-19 DIAGNOSIS — R29.898 DECREASED STRENGTH OF TRUNK AND BACK: ICD-10-CM

## 2024-11-19 PROCEDURE — 97750 PHYSICAL PERFORMANCE TEST: CPT | Mod: 32,PO

## 2024-11-19 NOTE — PROGRESS NOTES
[facility]  Physical Therapy Treatment Note     Name: Delma Christianson  Clinic Number: [id number]    Therapy Diagnosis:   Encounter Diagnoses   Name Primary?    Chronic bilateral low back pain without sciatica Yes    Decreased ROM of trunk and back     Decreased strength of trunk and back        Physician: Tameka Fischer PA-C    Visit Date: 11/19/2024    Physician Orders: PT Eval and Treat- Healthy Back  Medical Diagnosis from Referral: chronic back pain, lumbar back pain  Evaluation Date: 10/9/2024  Authorization Period Expiration: 12/31/24  Plan of Care Expiration: 12/18/2024  Reassessment Due: 11/9/2024  Visit # / Visits authorized: 9/20 (+1 prior auth)  MedX testing visit 2  PTA Visit #: 1/5     Time In: 11:00 am  Time Out: 12:13 pm  Total Billable Time: 60 minutes  INSURANCE and OUTCOMES: Program Benefit Group with Lumbar Outcomes (Oswestry and AQoL) 1/3    Precautions: standard    Pattern of pain determined: Pattern 1 PEP    Subjective     Delma Christianson  stated his LB is better and feels his hamstrings are not as tight.   Patient reports tolerating previous visit without adverse effect.  Patient reports their pain to be 0/10 on a 0-10 scale with 0 being no pain and 10 being the worst pain imaginable.  Pain Location:  low back worse on right      Work and leisure: Occupation: Peds ER MD  Leisure: garden, watch baby, play golf, painting/art  Pt goals: strengthen core and back, prevent episodes of pain    Objective      Lumbar  Isometric Testing on Med X equipment: Testing administered by PT    Test Initial Baseline Midpoint Final   Date 10/18/24     ROM 0-54 deg     Max Peak Torque 227      Min Peak Torque 122      Flex/Ext Ratio 1.9:1     % variance  normative data -30%     % change from initial test N/A visit 1             Outcomes: intake score  OUTCOMES SELECTION:   Program Patient Outcome Measures     Oswestry Score:  3/50 = 6% disability      AQoL  Score:  2/36 = 1% disability          Visit 6 Score:   Visit 10 Score:   Discharge Score:  Goal Score: 0%     Treatment     Eidustin received the treatments listed below:      Medical MedX Treatment as follows:    MedX exercise started day 2:  Patient received neuromuscular education for 15 minutes via participation on the Medical MedX Machine. Therapist assisted patient in isolating and engaging spinal stabilization musculature in order to improve functional ability and postural control. Patient performed exercise with therapist guidance in order to accurately use pacer function, avoid valsalva, and optimally exert effort within a safe and effective range via the Bianca Exertion Rating Scale. Patient instructed to perform at a midrange of exertion and to complete 15-20 repetitions within appropriate split time, with proper technique, and while maintaining safety.              11/19/2024    12:07 PM   [facility]   Visit Number 10   VAS Pain Rating 0   Treadmill Time (in min.) 10 min   Extension in Lying 20   Extension in Standing 10   Lumbar Weight 63 lbs   Repetitions 20   Rating of Perceived Exertion 5   Ice - Z Lie (in min.) 10          Eidustin participated in neuromuscular re-education activities to improve balance, coordination, proprioception, motor control and/or posture for 00 minutes. The following activities were included:      Eilan participated in therapeutic exercises to develop strength, endurance, ROM, flexibility, posture, and core stabilization for 45 minutes including:   Prone on elbows 2 min  Press ups 2/10  Bridging 2/10  Standing extension x10  Hamstring stretch 3x 30 sec ea  Piriformis stretch 3x 30 sec ea  Gluteus stretch 3x 30 sec ea      Peripheral muscle strengthening which included one set of 15-20 repetitions at a slow and controlled 10-13 second per rep pace focused on strengthening supporting musculature in order to improve body mechanics and functional mobility. Patient and therapist  focused on proper form during treatment to ensure optimal strengthening of each targeted muscle group.  Machines utilized included:Torso rotation, Leg Ext, Leg Curl, Chest Press, Rowing, Triceps, Biceps, Hip Abd, Hip Add, and Leg Press      Delma participated in dynamic functional therapeutic activities to improve functional performance and simulate household and community activities for 00  minutes. The following activities were included:      Delma received manual therapy techniques for 00  minutes. The following activities were included:      Pt given cold pack for 10 minutes to low back in Z-lie.    Patient Education and Home Exercises     Home exercises include:  Prone on elbows 2 min  Press ups 2/10  Bridging 2/10  Standing extension x10  Hamstring stretch  Piriformis stretch  Glute stretch  Cardio program (V5): - 10/28/24  Lifting education (V11): -  Posture/Lumbar roll: instruct visit 1  [employer] Discharge handout (date given): -  Equipment at home/gym membership: no    Education provided:   - PT role and POC  - HEP  - instruct in improved sitting posture, use of lumbar support and frequent extension throughout day    Written Home Exercises Provided: yes.  Exercises were reviewed and Delma was able to demonstrate them prior to the end of the session.  Delma demonstrated good  understanding of the education provided.     See EMR under Patient Instructions for exercises provided prior visit.    Assessment     Delma is tolerated the lumbar medx better with no DOMS post sessions. He was able to progress with reps today with minimal c/o LBP and no adverse affect.  Several peripheral exercises at challenging level. He has been feeling better and complaint with his HEP.   Patient is making good progress towards established goals.  Pt will continue to benefit from skilled outpatient physical therapy to address the deficits stated in the impairment chart, provide pt/family education and to maximize pt's level of  independence in the home and community environment.     Anticipated Barriers for therapy: none  Pt's spiritual, cultural and educational needs considered and pt agreeable to plan of care and goals as stated below:     Goals:   Short term goals:  6 weeks or 10 visits   - Pt will demonstrate increased lumbar MedX ROM by at least 3 degrees from the initial ROM value with improvements noted in functional ROM and ability to perform ADLs. Appropriate and Ongoing  - Pt will demonstrate increased MedX average isometric strength value by 20% from initial test resulting in improved ability to perform bending, lifting, and carrying activities safely, confidently. Appropriate and Ongoing  - Pt will report a reduction in worst pain score by 1-2 points for improved tolerance for bending. Appropriate and Ongoing  - Pt able to perform HEP correctly with minimal cueing or supervision from therapist to encourage independent management of symptoms. Appropriate and Ongoing     Long term goals: 10 weeks or 20 visits   - Pt will demonstrate increased lumbar MedX ROM by at least 6 degrees from initial ROM value, resulting in improved ability to perform functional forward bending while standing and sitting. Appropriate and Ongoing  - Pt will demonstrate increased MedX average isometric strength value by 30% from initial test resulting in improved ability to perform bending, lifting, and carrying activities safely and confidently. Appropriate and Ongoing  - Pt to demonstrate ability to independently control and reduce their pain through posture positioning and mechanical movements throughout a typical day. Appropriate and Ongoing  - Pt will demonstrate reduced pain and improved functional outcomes as reported on the Oswestry Disability Index by reaching a score of 2% or less in order to demonstrate subjective improvement in pt's condition. . Appropriate and Ongoing    - Pt will demonstrate independence with the HEP at discharge. Appropriate  and Ongoing  - Pt will strengthen core and back, prevent episodes of pain(patient goal) Appropriate and Ongoing       Plan     Continue with established Plan of Care towards established PT goals.     Therapist: Laurie Perez, PTA  11/19/2024

## 2024-11-25 ENCOUNTER — CLINICAL SUPPORT (OUTPATIENT)
Dept: REHABILITATION | Facility: HOSPITAL | Age: 39
End: 2024-11-25
Payer: COMMERCIAL

## 2024-11-25 DIAGNOSIS — M25.69 DECREASED ROM OF TRUNK AND BACK: ICD-10-CM

## 2024-11-25 DIAGNOSIS — G89.29 CHRONIC BILATERAL LOW BACK PAIN WITHOUT SCIATICA: Primary | ICD-10-CM

## 2024-11-25 DIAGNOSIS — M54.50 CHRONIC BILATERAL LOW BACK PAIN WITHOUT SCIATICA: Primary | ICD-10-CM

## 2024-11-25 DIAGNOSIS — R29.898 DECREASED STRENGTH OF TRUNK AND BACK: ICD-10-CM

## 2024-11-25 PROCEDURE — 97750 PHYSICAL PERFORMANCE TEST: CPT | Mod: 32,PO | Performed by: PHYSICAL THERAPIST

## 2024-11-25 NOTE — PROGRESS NOTES
[facility]  Physical Therapy Treatment Note     Name: Delma Christianson  Clinic Number: [id number]    Therapy Diagnosis:   Encounter Diagnoses   Name Primary?    Chronic bilateral low back pain without sciatica Yes    Decreased ROM of trunk and back     Decreased strength of trunk and back          Physician: Tameka Fischer PA-C    Visit Date: 11/25/2024    Physician Orders: PT Eval and Treat- Healthy Back  Medical Diagnosis from Referral: chronic back pain, lumbar back pain  Evaluation Date: 10/9/2024  Authorization Period Expiration: 12/31/24  Plan of Care Expiration: 12/18/2024  Reassessment 11/25/24  Reassessment Due: visit 20  Visit # / Visits authorized: 10/20 (+1 prior auth)  MedX testing visit 2  PTA Visit #: 0/5     Time In: 12:57pm  Time Out: 2:07 pm  Total Billable Time: 60 minutes  INSURANCE and OUTCOMES: Program Benefit Group with Lumbar Outcomes (Oswestry and AQoL) 2/3    Precautions: standard    Pattern of pain determined: Pattern 1 PEP    Subjective     Delma Christianson  he is feeling good and is without pain.  Patient reports tolerating previous visit without adverse effect.  Patient reports their pain to be 0/10 on a 0-10 scale with 0 being no pain and 10 being the worst pain imaginable.  Pain Location:  low back worse on right      Work and leisure: Occupation: Peds ER MD  Leisure: garden, watch baby, play golf, painting/art  Pt goals: strengthen core and back, prevent episodes of pain    Objective      Lumbar  Isometric Testing on Med X equipment: Testing administered by PT    Test Initial Baseline Midpoint Final   Date 10/18/24 11/25/24    ROM 0-54 deg 0-57 deg    Max Peak Torque 227  251    Min Peak Torque 122  130    Flex/Ext Ratio 1.9:1 1.9:1    % variance  normative data -30% -21%    % change from initial test N/A visit 1 ++14%            Outcomes: intake score  OUTCOMES SELECTION:   Program Patient Outcome Measures     Oswestry Score:  3/50 = 6%  disability      AQoL Score:  2/36 = 1% disability          Visit 11 Score: 4% Oswestry (2 raw score), AQoL 1  Discharge Score:  Goal Score: 0%     Treatment     Delma received the treatments listed below:      Delma was seen for performance testing for spinal musculature  to engage spinal musculature correctly for isometric testing within patient's range of motion, with comparison to baseline testing for  15 minutes.  This includes the MedX practice exercise component and practice and standard testing.  Med x dynamic exercise and testing performed with instructions to guide the patient safely through the isometric testing.  Patient informed to perform isometric test correctly, and safely, building best force they safely can and not pushing through pain.  Patient demonstrated understanding of information       Medical MedX Treatment as follows:    MedX exercise started day 2:  Patient received neuromuscular education for 00 minutes via participation on the Medical MedX Machine. Therapist assisted patient in isolating and engaging spinal stabilization musculature in order to improve functional ability and postural control. Patient performed exercise with therapist guidance in order to accurately use pacer function, avoid valsalva, and optimally exert effort within a safe and effective range via the Bianca Exertion Rating Scale. Patient instructed to perform at a midrange of exertion and to complete 15-20 repetitions within appropriate split time, with proper technique, and while maintaining safety.              11/25/2024     1:26 PM   [facility]   Visit Number 11   VAS Pain Rating 0   Treadmill Time (in min.) 10 min   Extension in Lying 20   Extension in Standing 10   Lumbar Flexion 57   Lumbar Extension 0   Lumbar Peak Torque 251 ft. lbs.   Min Torque 130   Test Percent Below Normative Data 21 %   Test Percent Gain in Strength from Initial  14 %   Ice - Z Lie (in min.) 10              Delma participated in  neuromuscular re-education activities to improve balance, coordination, proprioception, motor control and/or posture for 00 minutes. The following activities were included:      Eilan participated in therapeutic exercises to develop strength, endurance, ROM, flexibility, posture, and core stabilization for 45 minutes including:   Prone on elbows 2 min  Press ups 2/10  Bridging 2/10  Standing extension x10  Hamstring stretch 3x 30 sec ea  Piriformis stretch 3x 30 sec ea  Gluteus stretch 3x 30 sec ea      Peripheral muscle strengthening which included one set of 15-20 repetitions at a slow and controlled 10-13 second per rep pace focused on strengthening supporting musculature in order to improve body mechanics and functional mobility. Patient and therapist focused on proper form during treatment to ensure optimal strengthening of each targeted muscle group.  Machines utilized included:Torso rotation, Leg Ext, Leg Curl, Chest Press, Rowing, Triceps, Biceps, Hip Abd, Hip Add, and Leg Press      Eilan participated in dynamic functional therapeutic activities to improve functional performance and simulate household and community activities for 00  minutes. The following activities were included:      Eilan received manual therapy techniques for 00  minutes. The following activities were included:      Pt given cold pack for 10 minutes to low back in Z-lie.    Patient Education and Home Exercises     Home exercises include:  Prone on elbows 2 min  Press ups 2/10  Bridging 2/10  Standing extension x10  Hamstring stretch  Piriformis stretch  Glute stretch  Cardio program (V5): - 10/28/24  Lifting education (V11): -  Posture/Lumbar roll: instruct visit 1  [employer] Discharge handout (date given): -  Equipment at home/gym membership: no    Education provided:   - PT role and POC  - HEP  - instruct in improved sitting posture, use of lumbar support and frequent extension throughout day    Written Home Exercises Provided:  yes.  Exercises were reviewed and Jyothidustin was able to demonstrate them prior to the end of the session.  Eilan demonstrated good  understanding of the education provided.     See EMR under Patient Instructions for exercises provided prior visit.    Assessment       Reassessment 11/25/24:  Patient has attended 11 visits at [facility] which included MD evaluation, PT evaluation with isometric testing, and physical therapy treatment including HEP instruction, education, aerobic activity, dynamic strengthening on MedX equipment for the spine, and whole body strengthening on MedX equipment with increasing resistance. Patient demonstrates increased ability to reduce symptoms, improve posture, improve ROM, and improve strength, as stated below:    -Improved posture, he is using lumbar roll  -Improved lumbar ROM, 0-54 degrees initially on MedX test and 0-57 degrees currently .  -Improved strength at each test point on lumbar MedX isometric test with 14% average improvement noted with reduced pain noted by patient.  -Initial outcome tool score of 6% and current outcome tool score 4% indicating reduced pain and improved function.        Patient is making good progress towards established goals.  Pt will continue to benefit from skilled outpatient physical therapy to address the deficits stated in the impairment chart, provide pt/family education and to maximize pt's level of independence in the home and community environment.     Anticipated Barriers for therapy: none  Pt's spiritual, cultural and educational needs considered and pt agreeable to plan of care and goals as stated below:     Goals:   Short term goals:  6 weeks or 10 visits   - Pt will demonstrate increased lumbar MedX ROM by at least 3 degrees from the initial ROM value with improvements noted in functional ROM and ability to perform ADLs. Appropriate and Ongoing Met 11/25/24  - Pt will demonstrate increased MedX average isometric strength value by 20%  from initial test resulting in improved ability to perform bending, lifting, and carrying activities safely, confidently. Appropriate and Ongoing Progressing  - Pt will report a reduction in worst pain score by 1-2 points for improved tolerance for bending. Appropriate and Ongoing Met 11/25/24  - Pt able to perform HEP correctly with minimal cueing or supervision from therapist to encourage independent management of symptoms. Appropriate and Ongoing Met 11/25/24     Long term goals: 10 weeks or 20 visits   - Pt will demonstrate increased lumbar MedX ROM by at least 6 degrees from initial ROM value, resulting in improved ability to perform functional forward bending while standing and sitting. Appropriate and Ongoing Progressing  - Pt will demonstrate increased MedX average isometric strength value by 30% from initial test resulting in improved ability to perform bending, lifting, and carrying activities safely and confidently. Appropriate and Ongoing Progressing  - Pt to demonstrate ability to independently control and reduce their pain through posture positioning and mechanical movements throughout a typical day. Appropriate and Ongoing Progressing  - Pt will demonstrate reduced pain and improved functional outcomes as reported on the Oswestry Disability Index by reaching a score of 2% or less in order to demonstrate subjective improvement in pt's condition. . Appropriate and Ongoing Progressing  - Pt will demonstrate independence with the HEP at discharge. Appropriate and Ongoing  - Pt will strengthen core and back, prevent episodes of pain(patient goal) Appropriate and Ongoing       Plan     Continue with established Plan of Care towards established PT goals.     Therapist: Senia Machuca, PT  11/25/2024

## 2024-12-02 ENCOUNTER — CLINICAL SUPPORT (OUTPATIENT)
Dept: REHABILITATION | Facility: HOSPITAL | Age: 39
End: 2024-12-02
Payer: COMMERCIAL

## 2024-12-02 DIAGNOSIS — M25.69 DECREASED ROM OF TRUNK AND BACK: ICD-10-CM

## 2024-12-02 DIAGNOSIS — M54.50 CHRONIC BILATERAL LOW BACK PAIN WITHOUT SCIATICA: Primary | ICD-10-CM

## 2024-12-02 DIAGNOSIS — R29.898 DECREASED STRENGTH OF TRUNK AND BACK: ICD-10-CM

## 2024-12-02 DIAGNOSIS — G89.29 CHRONIC BILATERAL LOW BACK PAIN WITHOUT SCIATICA: Primary | ICD-10-CM

## 2024-12-02 PROCEDURE — 97750 PHYSICAL PERFORMANCE TEST: CPT | Mod: 32,PO | Performed by: PHYSICAL THERAPIST

## 2024-12-02 NOTE — PROGRESS NOTES
[facility] OUTPATIENT THERAPY AND WELLNESS - [facility] BACK  Physical Therapy Treatment Note     Name: Delma Christianson  Clinic Number: [id number]    Therapy Diagnosis:   Encounter Diagnoses   Name Primary?    Chronic bilateral low back pain without sciatica Yes    Decreased ROM of trunk and back     Decreased strength of trunk and back            Physician: Tameka Fischer PA-C    Visit Date: 12/2/2024    Physician Orders: PT Eval and Treat- Healthy Back  Medical Diagnosis from Referral: chronic back pain, lumbar back pain  Evaluation Date: 10/9/2024  Authorization Period Expiration: 12/31/24  Plan of Care Expiration: 12/18/2024  Reassessment 11/25/24  Reassessment Due: visit 20  Visit # / Visits authorized: 10/20 (+1 prior auth)  MedX testing visit 2  PTA Visit #: 0/5     Time In: 12:55pm  Time Out: 1:55 pm  Total Billable Time: 60 minutes  INSURANCE and OUTCOMES: Program Benefit Group with Lumbar Outcomes (Oswestry and AQoL) 2/3    Precautions: standard    Pattern of pain determined: Pattern 1 PEP    Subjective     Delma Christianson  he has been ill, but is wihout pain currently.  Patient reports tolerating previous visit without adverse effect.  Patient reports their pain to be 0/10 on a 0-10 scale with 0 being no pain and 10 being the worst pain imaginable.  Pain Location:  low back worse on right      Work and leisure: Occupation: Peds ER MD  Leisure: garden, watch baby, play golf, painting/art  Pt goals: strengthen core and back, prevent episodes of pain    Objective      Lumbar  Isometric Testing on Med X equipment: Testing administered by PT    Test Initial Baseline Midpoint Final   Date 10/18/24 11/25/24    ROM 0-54 deg 0-57 deg    Max Peak Torque 227  251    Min Peak Torque 122  130    Flex/Ext Ratio 1.9:1 1.9:1    % variance  normative data -30% -21%    % change from initial test N/A visit 1 +14%            Outcomes: intake score  OUTCOMES SELECTION:   Program Patient Outcome Measures     Oswestry Score:  3/50  = 6% disability      AQoL Score:  2/36 = 1% disability          Visit 11 Score: 4% Oswestry (2 raw score), AQoL 1  Discharge Score:  Goal Score: 0%     Treatment     Eidustin received the treatments listed below:        Medical MedX Treatment as follows:    MedX exercise started day 2:  Patient received neuromuscular education for 15 minutes via participation on the Medical MedX Machine. Therapist assisted patient in isolating and engaging spinal stabilization musculature in order to improve functional ability and postural control. Patient performed exercise with therapist guidance in order to accurately use pacer function, avoid valsalva, and optimally exert effort within a safe and effective range via the Bianca Exertion Rating Scale. Patient instructed to perform at a midrange of exertion and to complete 15-20 repetitions within appropriate split time, with proper technique, and while maintaining safety.              12/2/2024     1:08 PM   [facility]   Visit Number 12   VAS Pain Rating 0   Treadmill Time (in min.) 10 min   Extension in Lying 20   Extension in Standing 10   Lumbar Weight 63 lbs   Repetitions 20   Rating of Perceived Exertion 5   Ice - Z Lie (in min.) 10         Eidustin participated in neuromuscular re-education activities to improve balance, coordination, proprioception, motor control and/or posture for 00 minutes. The following activities were included:      Eidustin participated in therapeutic exercises to develop strength, endurance, ROM, flexibility, posture, and core stabilization for 45 minutes including:   Prone on elbows 2 min  Press ups 2/10  Bridging 2/10  Standing extension x10  Hamstring stretch 3x 30 sec ea  Piriformis stretch 3x 30 sec ea  Gluteus stretch 3x 30 sec ea      Peripheral muscle strengthening which included one set of 15-20 repetitions at a slow and controlled 10-13 second per rep pace focused on strengthening supporting musculature in order to improve body mechanics and  functional mobility. Patient and therapist focused on proper form during treatment to ensure optimal strengthening of each targeted muscle group.  Machines utilized included:Torso rotation, Leg Ext, Leg Curl, Chest Press, Rowing, Triceps, Biceps, Hip Abd, Hip Add, and Leg Press      Eilan participated in dynamic functional therapeutic activities to improve functional performance and simulate household and community activities for 00  minutes. The following activities were included:      Eilan received manual therapy techniques for 00  minutes. The following activities were included:      Pt given cold pack for 10 minutes to low back in Z-lie.    Patient Education and Home Exercises     Home exercises include:  Prone on elbows 2 min  Press ups 2/10  Bridging 2/10  Standing extension x10  Hamstring stretch  Piriformis stretch  Glute stretch  Cardio program (V5): - 10/28/24  Lifting education (V11): - 12/2/24  Posture/Lumbar roll: instruct visit 1  [employer] Discharge handout (date given): -  Equipment at home/gym membership: no    Education provided:   - PT role and POC  - HEP  - instruct in improved sitting posture, use of lumbar support and frequent extension throughout day    Written Home Exercises Provided: yes.  Exercises were reviewed and Eidustin was able to demonstrate them prior to the end of the session.  Eilan demonstrated good  understanding of the education provided.     See EMR under Patient Instructions for exercises provided prior visit.    Assessment     Pt has responded well with no complaints of pain recently. He is gradually progressing with strength and flexibility. Reviewed Do's and Don'ts of bending and lifting. Pt given handout for reference.    Reassessment 11/25/24:  Patient has attended 11 visits at [facility] which included MD evaluation, PT evaluation with isometric testing, and physical therapy treatment including HEP instruction, education, aerobic activity, dynamic  strengthening on MedX equipment for the spine, and whole body strengthening on MedX equipment with increasing resistance. Patient demonstrates increased ability to reduce symptoms, improve posture, improve ROM, and improve strength, as stated below:    -Improved posture, he is using lumbar roll  -Improved lumbar ROM, 0-54 degrees initially on MedX test and 0-57 degrees currently .  -Improved strength at each test point on lumbar MedX isometric test with 14% average improvement noted with reduced pain noted by patient.  -Initial outcome tool score of 6% and current outcome tool score 4% indicating reduced pain and improved function.        Patient is making good progress towards established goals.  Pt will continue to benefit from skilled outpatient physical therapy to address the deficits stated in the impairment chart, provide pt/family education and to maximize pt's level of independence in the home and community environment.     Anticipated Barriers for therapy: none  Pt's spiritual, cultural and educational needs considered and pt agreeable to plan of care and goals as stated below:     Goals:   Short term goals:  6 weeks or 10 visits   - Pt will demonstrate increased lumbar MedX ROM by at least 3 degrees from the initial ROM value with improvements noted in functional ROM and ability to perform ADLs. Appropriate and Ongoing Met 11/25/24  - Pt will demonstrate increased MedX average isometric strength value by 20% from initial test resulting in improved ability to perform bending, lifting, and carrying activities safely, confidently. Appropriate and Ongoing Progressing  - Pt will report a reduction in worst pain score by 1-2 points for improved tolerance for bending. Appropriate and Ongoing Met 11/25/24  - Pt able to perform HEP correctly with minimal cueing or supervision from therapist to encourage independent management of symptoms. Appropriate and Ongoing Met 11/25/24     Long term goals: 10 weeks or 20  visits   - Pt will demonstrate increased lumbar MedX ROM by at least 6 degrees from initial ROM value, resulting in improved ability to perform functional forward bending while standing and sitting. Appropriate and Ongoing Progressing  - Pt will demonstrate increased MedX average isometric strength value by 30% from initial test resulting in improved ability to perform bending, lifting, and carrying activities safely and confidently. Appropriate and Ongoing Progressing  - Pt to demonstrate ability to independently control and reduce their pain through posture positioning and mechanical movements throughout a typical day. Appropriate and Ongoing Progressing  - Pt will demonstrate reduced pain and improved functional outcomes as reported on the Oswestry Disability Index by reaching a score of 2% or less in order to demonstrate subjective improvement in pt's condition. . Appropriate and Ongoing Progressing  - Pt will demonstrate independence with the HEP at discharge. Appropriate and Ongoing  - Pt will strengthen core and back, prevent episodes of pain(patient goal) Appropriate and Ongoing       Plan     Continue with established Plan of Care towards established PT goals.     Therapist: Senia Machuca, PT  12/2/2024

## 2024-12-06 ENCOUNTER — CLINICAL SUPPORT (OUTPATIENT)
Dept: REHABILITATION | Facility: HOSPITAL | Age: 39
End: 2024-12-06
Payer: COMMERCIAL

## 2024-12-06 DIAGNOSIS — G89.29 CHRONIC BILATERAL LOW BACK PAIN WITHOUT SCIATICA: Primary | ICD-10-CM

## 2024-12-06 DIAGNOSIS — M54.50 CHRONIC BILATERAL LOW BACK PAIN WITHOUT SCIATICA: Primary | ICD-10-CM

## 2024-12-06 DIAGNOSIS — M25.69 DECREASED ROM OF TRUNK AND BACK: ICD-10-CM

## 2024-12-06 DIAGNOSIS — R29.898 DECREASED STRENGTH OF TRUNK AND BACK: ICD-10-CM

## 2024-12-06 PROCEDURE — 97750 PHYSICAL PERFORMANCE TEST: CPT | Mod: 32,PO | Performed by: PHYSICAL THERAPIST

## 2024-12-06 NOTE — PROGRESS NOTES
[facility]  Physical Therapy Treatment Note     Name: Delma Christianson  Clinic Number: [id number]    Therapy Diagnosis:   Encounter Diagnoses   Name Primary?    Chronic bilateral low back pain without sciatica Yes    Decreased ROM of trunk and back     Decreased strength of trunk and back              Physician: Tameka Fischer PA-C    Visit Date: 12/6/2024    Physician Orders: PT Eval and Treat- [facility]  Medical Diagnosis from Referral: chronic back pain, lumbar back pain  Evaluation Date: 10/9/2024  Authorization Period Expiration: 12/31/24  Plan of Care Expiration: 12/18/2024  Reassessment 11/25/24  Reassessment Due: visit 20  Visit # / Visits authorized: 12/20 (+1 prior auth)  MedX testing visit 2  PTA Visit #: 0/5     Time In: 11:00am  Time Out: 12:00pm  Total Billable Time: 50 minutes  INSURANCE and OUTCOMES: Program Benefit Group with Lumbar Outcomes (Oswestry and AQoL) 2/3    Precautions: standard    Pattern of pain determined: Pattern 1 PEP    Subjective     Delma Christianson  he is feeling better and is without pain.  Patient reports tolerating previous visit without adverse effect.  Patient reports their pain to be 0/10 on a 0-10 scale with 0 being no pain and 10 being the worst pain imaginable.  Pain Location:  low back worse on right      Work and leisure: Occupation: Peds ER MD  Leisure: garden, watch baby, play golf, painting/art  Pt goals: strengthen core and back, prevent episodes of pain    Objective      Lumbar  Isometric Testing on Med X equipment: Testing administered by PT    Test Initial Baseline Midpoint Final   Date 10/18/24 11/25/24    ROM 0-54 deg 0-57 deg    Max Peak Torque 227  251    Min Peak Torque 122  130    Flex/Ext Ratio 1.9:1 1.9:1    % variance  normative data -30% -21%    % change from initial test N/A visit 1 +14%            Outcomes: intake score  OUTCOMES SELECTION:   Program Patient Outcome Measures     Oswestry Score:  3/50 =  6% disability      AQoL Score:  2/36 = 1% disability          Visit 11 Score: 4% Oswestry (2 raw score), AQoL 1  Discharge Score:  Goal Score: 0%     Treatment     Delma received the treatments listed below:        Medical MedX Treatment as follows:    MedX exercise started day 2:  Patient received neuromuscular education for 15 minutes via participation on the Medical MedX Machine. Therapist assisted patient in isolating and engaging spinal stabilization musculature in order to improve functional ability and postural control. Patient performed exercise with therapist guidance in order to accurately use pacer function, avoid valsalva, and optimally exert effort within a safe and effective range via the Bianca Exertion Rating Scale. Patient instructed to perform at a midrange of exertion and to complete 15-20 repetitions within appropriate split time, with proper technique, and while maintaining safety.              12/6/2024    11:25 AM   [facility]   Visit Number 13   VAS Pain Rating 0   Treadmill Time (in min.) 10 min   Extension in Lying 20   Extension in Standing 10   Lumbar Weight 63 lbs   Repetitions 20   Rating of Perceived Exertion 3   Ice - Z Lie (in min.) 10       Eidustin participated in neuromuscular re-education activities to improve balance, coordination, proprioception, motor control and/or posture for 00 minutes. The following activities were included:      Eidustin participated in therapeutic exercises to develop strength, endurance, ROM, flexibility, posture, and core stabilization for 35 minutes including:   Prone on elbows 2 min  Press ups 2/10  Bridging 2/10  Standing extension x10  Hamstring stretch 3x 30 sec ea  Piriformis stretch 3x 30 sec ea  Gluteus stretch 3x 30 sec ea      Peripheral muscle strengthening which included one set of 15-20 repetitions at a slow and controlled 10-13 second per rep pace focused on strengthening supporting musculature in order to improve body mechanics and  functional mobility. Patient and therapist focused on proper form during treatment to ensure optimal strengthening of each targeted muscle group.  Machines utilized included:Torso rotation, Leg Ext, Leg Curl, Chest Press, Rowing, Triceps, Biceps, Hip Abd, Hip Add, and Leg Press      Eilan participated in dynamic functional therapeutic activities to improve functional performance and simulate household and community activities for 00  minutes. The following activities were included:      Eilan received manual therapy techniques for 00  minutes. The following activities were included:      Pt given cold pack for 10 minutes to low back in Z-lie.    Patient Education and Home Exercises     Home exercises include:  Prone on elbows 2 min  Press ups 2/10  Bridging 2/10  Standing extension x10  Hamstring stretch  Piriformis stretch  Glute stretch  Cardio program (V5): - 10/28/24  Lifting education (V11): - 12/2/24  Posture/Lumbar roll: instruct visit 1  [place] Discharge handout (date given): -  Equipment at home/gym membership: no    Education provided:   - PT role and POC  - HEP  - instruct in improved sitting posture, use of lumbar support and frequent extension throughout day    Written Home Exercises Provided: yes.  Exercises were reviewed and Eidustin was able to demonstrate them prior to the end of the session.  Eilan demonstrated good  understanding of the education provided.     See EMR under Patient Instructions for exercises provided prior visit.    Assessment     Pt demonstrating better awareness of posture, improved lumbar extension and LE flexibility and improved overall strength as indicated by progressing resistance on MEDX, torso rotation and peripheral machnes with appropriate perceived exertion..    Reassessment 11/25/24:  Patient has attended 11 visits at [facility] which included MD evaluation, PT evaluation with isometric testing, and physical therapy treatment including HEP instruction,  education, aerobic activity, dynamic strengthening on MedX equipment for the spine, and whole body strengthening on MedX equipment with increasing resistance. Patient demonstrates increased ability to reduce symptoms, improve posture, improve ROM, and improve strength, as stated below:    -Improved posture, he is using lumbar roll  -Improved lumbar ROM, 0-54 degrees initially on MedX test and 0-57 degrees currently .  -Improved strength at each test point on lumbar MedX isometric test with 14% average improvement noted with reduced pain noted by patient.  -Initial outcome tool score of 6% and current outcome tool score 4% indicating reduced pain and improved function.        Patient is making good progress towards established goals.  Pt will continue to benefit from skilled outpatient physical therapy to address the deficits stated in the impairment chart, provide pt/family education and to maximize pt's level of independence in the home and community environment.     Anticipated Barriers for therapy: none  Pt's spiritual, cultural and educational needs considered and pt agreeable to plan of care and goals as stated below:     Goals:   Short term goals:  6 weeks or 10 visits   - Pt will demonstrate increased lumbar MedX ROM by at least 3 degrees from the initial ROM value with improvements noted in functional ROM and ability to perform ADLs. Appropriate and Ongoing Met 11/25/24  - Pt will demonstrate increased MedX average isometric strength value by 20% from initial test resulting in improved ability to perform bending, lifting, and carrying activities safely, confidently. Appropriate and Ongoing Progressing  - Pt will report a reduction in worst pain score by 1-2 points for improved tolerance for bending. Appropriate and Ongoing Met 11/25/24  - Pt able to perform HEP correctly with minimal cueing or supervision from therapist to encourage independent management of symptoms. Appropriate and Ongoing Met 11/25/24      Long term goals: 10 weeks or 20 visits   - Pt will demonstrate increased lumbar MedX ROM by at least 6 degrees from initial ROM value, resulting in improved ability to perform functional forward bending while standing and sitting. Appropriate and Ongoing Progressing  - Pt will demonstrate increased MedX average isometric strength value by 30% from initial test resulting in improved ability to perform bending, lifting, and carrying activities safely and confidently. Appropriate and Ongoing Progressing  - Pt to demonstrate ability to independently control and reduce their pain through posture positioning and mechanical movements throughout a typical day. Appropriate and Ongoing Progressing  - Pt will demonstrate reduced pain and improved functional outcomes as reported on the Oswestry Disability Index by reaching a score of 2% or less in order to demonstrate subjective improvement in pt's condition. . Appropriate and Ongoing Progressing  - Pt will demonstrate independence with the HEP at discharge. Appropriate and Ongoing  - Pt will strengthen core and back, prevent episodes of pain(patient goal) Appropriate and Ongoing       Plan     Continue with established Plan of Care towards established PT goals.     Therapist: Senia Machuca, PT  12/6/2024

## 2024-12-11 ENCOUNTER — CLINICAL SUPPORT (OUTPATIENT)
Dept: REHABILITATION | Facility: HOSPITAL | Age: 39
End: 2024-12-11
Payer: COMMERCIAL

## 2024-12-11 DIAGNOSIS — R29.898 DECREASED STRENGTH OF TRUNK AND BACK: ICD-10-CM

## 2024-12-11 DIAGNOSIS — G89.29 CHRONIC BILATERAL LOW BACK PAIN WITHOUT SCIATICA: Primary | ICD-10-CM

## 2024-12-11 DIAGNOSIS — M54.50 CHRONIC BILATERAL LOW BACK PAIN WITHOUT SCIATICA: Primary | ICD-10-CM

## 2024-12-11 DIAGNOSIS — M25.69 DECREASED ROM OF TRUNK AND BACK: ICD-10-CM

## 2024-12-11 PROCEDURE — 97750 PHYSICAL PERFORMANCE TEST: CPT | Mod: 32,PO | Performed by: PHYSICAL THERAPIST

## 2024-12-11 NOTE — PROGRESS NOTES
[facility]  Physical Therapy Treatment Note     Name: Delma Christianson  Clinic Number: [id number]    Therapy Diagnosis:   Encounter Diagnoses   Name Primary?    Chronic bilateral low back pain without sciatica Yes    Decreased ROM of trunk and back     Decreased strength of trunk and back              Physician: Tameka Fischer PA-C    Visit Date: 12/11/2024    Physician Orders: PT Eval and Treat- Healthy Back  Medical Diagnosis from Referral: chronic back pain, lumbar back pain  Evaluation Date: 10/9/2024  Authorization Period Expiration: 12/31/24  Plan of Care Expiration: 12/18/2024  Reassessment 11/25/24  Reassessment Due: visit 20  Visit # / Visits authorized: 13/20 (+1 prior auth)  MedX testing visit 2  PTA Visit #: 0/5     Time In: 11:55am  Time Out: 12:50pm  Total Billable Time: 55 minutes  INSURANCE and OUTCOMES: Program Benefit Group with Lumbar Outcomes (Oswestry and AQoL) 2/3    Precautions: standard    Pattern of pain determined: Pattern 1 PEP    Subjective     Delma Christianson  he had some increased pain after long periods of sitting a few days ago, but reduced with stretching. No pain currently.   Patient reports their pain to be 0/10 on a 0-10 scale with 0 being no pain and 10 being the worst pain imaginable.  Pain Location:  low back worse on right      Work and leisure: Occupation: Peds ER MD  Leisure: garden, watch baby, play golf, painting/art  Pt goals: strengthen core and back, prevent episodes of pain    Objective      Lumbar  Isometric Testing on Med X equipment: Testing administered by PT    Test Initial Baseline Midpoint Final   Date 10/18/24 11/25/24    ROM 0-54 deg 0-57 deg    Max Peak Torque 227  251    Min Peak Torque 122  130    Flex/Ext Ratio 1.9:1 1.9:1    % variance  normative data -30% -21%    % change from initial test N/A visit 1 +14%            Outcomes: intake score  OUTCOMES SELECTION:   Program Patient Outcome Measures     Oswestry  Score:  3/50 = 6% disability      AQoL Score:  2/36 = 1% disability          Visit 11 Score: 4% Oswestry (2 raw score), AQoL 1  Discharge Score:  Goal Score: 0%     Treatment     Eidusitn received the treatments listed below:        Medical MedX Treatment as follows:    MedX exercise started day 2:  Patient received neuromuscular education for 15 minutes via participation on the Medical MedX Machine. Therapist assisted patient in isolating and engaging spinal stabilization musculature in order to improve functional ability and postural control. Patient performed exercise with therapist guidance in order to accurately use pacer function, avoid valsalva, and optimally exert effort within a safe and effective range via the Bianca Exertion Rating Scale. Patient instructed to perform at a midrange of exertion and to complete 15-20 repetitions within appropriate split time, with proper technique, and while maintaining safety.           12/11/2024     1:19 PM   [facility]   Visit Number 14   VAS Pain Rating 0   Treadmill Time (in min.) 10 min   Extension in Lying 20   Extension in Standing 10   Lumbar Weight 66 lbs   Repetitions 20   Rating of Perceived Exertion 4.5           Eidustin participated in neuromuscular re-education activities to improve balance, coordination, proprioception, motor control and/or posture for 00 minutes. The following activities were included:      Eidustin participated in therapeutic exercises to develop strength, endurance, ROM, flexibility, posture, and core stabilization for 40 minutes including:   Prone on elbows 2 min  Press ups 2/10  Bridging 2/10  Standing extension x10  Hamstring stretch 3x 30 sec ea  Piriformis stretch 3x 30 sec ea  Gluteus stretch 3x 30 sec ea      Peripheral muscle strengthening which included one set of 15-20 repetitions at a slow and controlled 10-13 second per rep pace focused on strengthening supporting musculature in order to improve body mechanics and functional  mobility. Patient and therapist focused on proper form during treatment to ensure optimal strengthening of each targeted muscle group.  Machines utilized included:Torso rotation, Leg Ext, Leg Curl, Chest Press, Rowing, Triceps, Biceps, Hip Abd, Hip Add, and Leg Press      Eilan participated in dynamic functional therapeutic activities to improve functional performance and simulate household and community activities for 00  minutes. The following activities were included:      Eilan received manual therapy techniques for 00  minutes. The following activities were included:      Pt given cold pack for 00 minutes to low back in Z-lie.    Patient Education and Home Exercises     Home exercises include:  Prone on elbows 2 min  Press ups 2/10  Bridging 2/10  Standing extension x10  Hamstring stretch  Piriformis stretch  Glute stretch  Cardio program (V5): - 10/28/24  Lifting education (V11): - 12/2/24  Posture/Lumbar roll: instruct visit 1  [facility] (date given): -  Equipment at home/gym membership: no    Education provided:   - PT role and POC  - HEP  - instruct in improved sitting posture, use of lumbar support and frequent extension throughout day    Written Home Exercises Provided: yes.  Exercises were reviewed and Delma was able to demonstrate them prior to the end of the session.  Eilan demonstrated good  understanding of the education provided.     See EMR under Patient Instructions for exercises provided prior visit.    Assessment     Pt aware of contribution of sitting posture to his pain. Able to reduce pain with improved posture and lumbar extension. Progressed with MEDX, torso rotation and several peripheral machines with appropriate perceived exertion..    Reassessment 11/25/24:  Patient has attended 11 visits at [facility] which included MD evaluation, PT evaluation with isometric testing, and physical therapy treatment including HEP instruction, education, aerobic activity,  dynamic strengthening on MedX equipment for the spine, and whole body strengthening on MedX equipment with increasing resistance. Patient demonstrates increased ability to reduce symptoms, improve posture, improve ROM, and improve strength, as stated below:    -Improved posture, he is using lumbar roll  -Improved lumbar ROM, 0-54 degrees initially on MedX test and 0-57 degrees currently .  -Improved strength at each test point on lumbar MedX isometric test with 14% average improvement noted with reduced pain noted by patient.  -Initial outcome tool score of 6% and current outcome tool score 4% indicating reduced pain and improved function.        Patient is making good progress towards established goals.  Pt will continue to benefit from skilled outpatient physical therapy to address the deficits stated in the impairment chart, provide pt/family education and to maximize pt's level of independence in the home and community environment.     Anticipated Barriers for therapy: none  Pt's spiritual, cultural and educational needs considered and pt agreeable to plan of care and goals as stated below:     Goals:   Short term goals:  6 weeks or 10 visits   - Pt will demonstrate increased lumbar MedX ROM by at least 3 degrees from the initial ROM value with improvements noted in functional ROM and ability to perform ADLs. Appropriate and Ongoing Met 11/25/24  - Pt will demonstrate increased MedX average isometric strength value by 20% from initial test resulting in improved ability to perform bending, lifting, and carrying activities safely, confidently. Appropriate and Ongoing Progressing  - Pt will report a reduction in worst pain score by 1-2 points for improved tolerance for bending. Appropriate and Ongoing Met 11/25/24  - Pt able to perform HEP correctly with minimal cueing or supervision from therapist to encourage independent management of symptoms. Appropriate and Ongoing Met 11/25/24     Long term goals: 10 weeks or  20 visits   - Pt will demonstrate increased lumbar MedX ROM by at least 6 degrees from initial ROM value, resulting in improved ability to perform functional forward bending while standing and sitting. Appropriate and Ongoing Progressing  - Pt will demonstrate increased MedX average isometric strength value by 30% from initial test resulting in improved ability to perform bending, lifting, and carrying activities safely and confidently. Appropriate and Ongoing Progressing  - Pt to demonstrate ability to independently control and reduce their pain through posture positioning and mechanical movements throughout a typical day. Appropriate and Ongoing Progressing  - Pt will demonstrate reduced pain and improved functional outcomes as reported on the Oswestry Disability Index by reaching a score of 2% or less in order to demonstrate subjective improvement in pt's condition. . Appropriate and Ongoing Progressing  - Pt will demonstrate independence with the HEP at discharge. Appropriate and Ongoing  - Pt will strengthen core and back, prevent episodes of pain(patient goal) Appropriate and Ongoing       Plan     Continue with established Plan of Care towards established PT goals.     Therapist: Senia Machuca, PT  12/11/2024

## 2024-12-12 ENCOUNTER — CLINICAL SUPPORT (OUTPATIENT)
Dept: REHABILITATION | Facility: HOSPITAL | Age: 39
End: 2024-12-12
Payer: COMMERCIAL

## 2024-12-12 DIAGNOSIS — M25.69 DECREASED ROM OF TRUNK AND BACK: ICD-10-CM

## 2024-12-12 DIAGNOSIS — G89.29 CHRONIC BILATERAL LOW BACK PAIN WITHOUT SCIATICA: Primary | ICD-10-CM

## 2024-12-12 DIAGNOSIS — M54.50 CHRONIC BILATERAL LOW BACK PAIN WITHOUT SCIATICA: Primary | ICD-10-CM

## 2024-12-12 DIAGNOSIS — R29.898 DECREASED STRENGTH OF TRUNK AND BACK: ICD-10-CM

## 2024-12-12 PROCEDURE — 97750 PHYSICAL PERFORMANCE TEST: CPT | Mod: 32,PO

## 2024-12-12 NOTE — PROGRESS NOTES
[facility]  Physical Therapy Treatment Note     Name: Delma Christianson  Clinic Number: [id number]    Therapy Diagnosis:   Encounter Diagnoses   Name Primary?    Chronic bilateral low back pain without sciatica Yes    Decreased ROM of trunk and back     Decreased strength of trunk and back              Physician: Tameka Fischer PA-C    Visit Date: 12/12/2024    Physician Orders: PT Eval and Treat- [facility]  Medical Diagnosis from Referral: chronic back pain, lumbar back pain  Evaluation Date: 10/9/2024  Authorization Period Expiration: 12/31/24  Plan of Care Expiration: 12/18/2024  Reassessment 11/25/24  Reassessment Due: visit 20  Visit # / Visits authorized: 14/20 (+1 prior auth)  MedX testing visit 2  PTA Visit #: 0/5     Time In: 1:00 PM  Time Out: 2:10 pm  Total Billable Time: 60 minutes  INSURANCE and OUTCOMES: Program Benefit Group with Lumbar Outcomes (Oswestry and AQoL) 2/3    Precautions: standard    Pattern of pain determined: Pattern 1 PEP    Subjective     Delma Christianson  he is w/o complaint of pn at this time, just some triceps mm soreness.   Patient reports their pain to be 0/10 on a 0-10 scale with 0 being no pain and 10 being the worst pain imaginable.  Pain Location:  low back worse on right      Work and leisure: Occupation: Peds ER MD  Leisure: garden, watch baby, play golf, painting/art  Pt goals: strengthen core and back, prevent episodes of pain    Objective      Lumbar  Isometric Testing on Med X equipment: Testing administered by PT    Test Initial Baseline Midpoint Final   Date 10/18/24 11/25/24    ROM 0-54 deg 0-57 deg    Max Peak Torque 227  251    Min Peak Torque 122  130    Flex/Ext Ratio 1.9:1 1.9:1    % variance  normative data -30% -21%    % change from initial test N/A visit 1 +14%            Outcomes: intake score  OUTCOMES SELECTION:   Program Patient Outcome Measures     Oswestry Score:  3/50 = 6% disability      AQoL Score:  2/36  = 1% disability          Visit 11 Score: 4% Oswestry (2 raw score), AQoL 1  Discharge Score:  Goal Score: 0%     Treatment     Eidustin received the treatments listed below:        Medical MedX Treatment as follows:    MedX exercise started day 2:  Patient received neuromuscular education for 15 minutes via participation on the Medical MedX Machine. Therapist assisted patient in isolating and engaging spinal stabilization musculature in order to improve functional ability and postural control. Patient performed exercise with therapist guidance in order to accurately use pacer function, avoid valsalva, and optimally exert effort within a safe and effective range via the Bianca Exertion Rating Scale. Patient instructed to perform at a midrange of exertion and to complete 15-20 repetitions within appropriate split time, with proper technique, and while maintaining safety.           12/12/2024     3:03 PM   [facility]   Visit Number 15   VAS Pain Rating 0   Treadmill Time (in min.) 10 min   Extension in Lying 20   Extension in Standing 10   Lumbar Weight 66 lbs   Repetitions 20   Rating of Perceived Exertion 4   Ice - Z Lie (in min.) 10        Eidustin participated in neuromuscular re-education activities to improve balance, coordination, proprioception, motor control and/or posture for 00 minutes. The following activities were included:      Eilan participated in therapeutic exercises to develop strength, endurance, ROM, flexibility, posture, and core stabilization for 40 minutes including:   Prone on elbows 2 min  Press ups 2/10  Bridging 2/10  Standing extension x10  Hamstring stretch 3x 30 sec ea  Piriformis stretch 3x 30 sec ea  Gluteus stretch 3x 30 sec ea      Peripheral muscle strengthening which included one set of 15-20 repetitions at a slow and controlled 10-13 second per rep pace focused on strengthening supporting musculature in order to improve body mechanics and functional mobility. Patient and therapist  focused on proper form during treatment to ensure optimal strengthening of each targeted muscle group.  Machines utilized included:Torso rotation, Leg Ext, Leg Curl, Chest Press, Rowing, Triceps, Biceps, Hip Abd, Hip Add, and Leg Press      Delma participated in dynamic functional therapeutic activities to improve functional performance and simulate household and community activities for 00  minutes. The following activities were included:      Delma received manual therapy techniques for 00  minutes. The following activities were included:      Pt given cold pack for 00 minutes to low back in Z-lie.    Patient Education and Home Exercises     Home exercises include:  Prone on elbows 2 min  Press ups 2/10  Bridging 2/10  Standing extension x10  Hamstring stretch  Piriformis stretch  Glute stretch  Cardio program (V5): - 10/28/24  Lifting education (V11): - 12/2/24  Posture/Lumbar roll: instruct visit 1  [employer] Discharge handout (date given): -  Equipment at home/gym membership: no    Education provided:   - PT role and POC  - HEP  - instruct in improved sitting posture, use of lumbar support and frequent extension throughout day    Written Home Exercises Provided: yes.  Exercises were reviewed and Delma was able to demonstrate them prior to the end of the session.  Delma demonstrated good  understanding of the education provided.     See EMR under Patient Instructions for exercises provided prior visit.    Assessment   Delma mindy today's tx with progression of ther ex and neuromuscular re-ed well. He reported lower PE on MedX with no increase in resistance or reps. He progressed with resistance with peripheral machines with no decrease in reps with increased PE with some exs.  Pt aware of contribution of sitting posture to his pain. Able to reduce pain with improved posture and lumbar extension.   Reassessment 11/25/24:  Patient has attended 11 visits at [facility] which included MD evaluation, PT  evaluation with isometric testing, and physical therapy treatment including HEP instruction, education, aerobic activity, dynamic strengthening on MedX equipment for the spine, and whole body strengthening on MedX equipment with increasing resistance. Patient demonstrates increased ability to reduce symptoms, improve posture, improve ROM, and improve strength, as stated below:    -Improved posture, he is using lumbar roll  -Improved lumbar ROM, 0-54 degrees initially on MedX test and 0-57 degrees currently .  -Improved strength at each test point on lumbar MedX isometric test with 14% average improvement noted with reduced pain noted by patient.  -Initial outcome tool score of 6% and current outcome tool score 4% indicating reduced pain and improved function.        Patient is making good progress towards established goals.  Pt will continue to benefit from skilled outpatient physical therapy to address the deficits stated in the impairment chart, provide pt/family education and to maximize pt's level of independence in the home and community environment.     Anticipated Barriers for therapy: none  Pt's spiritual, cultural and educational needs considered and pt agreeable to plan of care and goals as stated below:     Goals:   Short term goals:  6 weeks or 10 visits   - Pt will demonstrate increased lumbar MedX ROM by at least 3 degrees from the initial ROM value with improvements noted in functional ROM and ability to perform ADLs. Appropriate and Ongoing Met 11/25/24  - Pt will demonstrate increased MedX average isometric strength value by 20% from initial test resulting in improved ability to perform bending, lifting, and carrying activities safely, confidently. Appropriate and Ongoing Progressing  - Pt will report a reduction in worst pain score by 1-2 points for improved tolerance for bending. Appropriate and Ongoing Met 11/25/24  - Pt able to perform HEP correctly with minimal cueing or supervision from  therapist to encourage independent management of symptoms. Appropriate and Ongoing Met 11/25/24     Long term goals: 10 weeks or 20 visits   - Pt will demonstrate increased lumbar MedX ROM by at least 6 degrees from initial ROM value, resulting in improved ability to perform functional forward bending while standing and sitting. Appropriate and Ongoing Progressing  - Pt will demonstrate increased MedX average isometric strength value by 30% from initial test resulting in improved ability to perform bending, lifting, and carrying activities safely and confidently. Appropriate and Ongoing Progressing  - Pt to demonstrate ability to independently control and reduce their pain through posture positioning and mechanical movements throughout a typical day. Appropriate and Ongoing Progressing  - Pt will demonstrate reduced pain and improved functional outcomes as reported on the Oswestry Disability Index by reaching a score of 2% or less in order to demonstrate subjective improvement in pt's condition. . Appropriate and Ongoing Progressing  - Pt will demonstrate independence with the HEP at discharge. Appropriate and Ongoing  - Pt will strengthen core and back, prevent episodes of pain(patient goal) Appropriate and Ongoing       Plan     Continue with established Plan of Care towards established PT goals.     Therapist: Osvaldo Prescott, PTA  12/12/2024

## 2024-12-23 ENCOUNTER — CLINICAL SUPPORT (OUTPATIENT)
Dept: REHABILITATION | Facility: HOSPITAL | Age: 39
End: 2024-12-23
Payer: COMMERCIAL

## 2024-12-23 DIAGNOSIS — R29.898 DECREASED STRENGTH OF TRUNK AND BACK: ICD-10-CM

## 2024-12-23 DIAGNOSIS — M25.69 DECREASED ROM OF TRUNK AND BACK: ICD-10-CM

## 2024-12-23 DIAGNOSIS — M54.50 CHRONIC BILATERAL LOW BACK PAIN WITHOUT SCIATICA: Primary | ICD-10-CM

## 2024-12-23 DIAGNOSIS — G89.29 CHRONIC BILATERAL LOW BACK PAIN WITHOUT SCIATICA: Primary | ICD-10-CM

## 2024-12-23 PROCEDURE — 97750 PHYSICAL PERFORMANCE TEST: CPT | Mod: 32,PO | Performed by: PHYSICAL THERAPIST

## 2024-12-23 NOTE — PROGRESS NOTES
[facility]  Physical Therapy Treatment Note     Name: Delma Christianson  Clinic Number: [id number]    Therapy Diagnosis:   Encounter Diagnoses   Name Primary?    Chronic bilateral low back pain without sciatica Yes    Decreased ROM of trunk and back     Decreased strength of trunk and back              Physician: Tameka Fischer PA-C    Visit Date: 12/23/2024    Physician Orders: PT Eval and Treat- Healthy Back  Medical Diagnosis from Referral: chronic back pain, lumbar back pain  Evaluation Date: 10/9/2024  Authorization Period Expiration: 12/31/24  Plan of Care Expiration: 12/18/2024  Reassessment 11/25/24  Reassessment Due: visit 20  Visit # / Visits authorized: 15/20 (+1 prior auth)  MedX testing visit 2  PTA Visit #: 0/5     Time In: 11:00AM  Time Out: 12:05 pm  Total Billable Time: 55 minutes  INSURANCE and OUTCOMES: Program Benefit Group with Lumbar Outcomes (Oswestry and AQoL) 2/3    Precautions: standard    Pattern of pain determined: Pattern 1 PEP    Subjective     Delma Christianson  he has no LBP currently. He does report some biceps pain.  Patient reports their pain to be 0/10 on a 0-10 scale with 0 being no pain and 10 being the worst pain imaginable.  Pain Location:  low back worse on right      Work and leisure: Occupation: Peds ER MD  Leisure: garden, watch baby, play golf, painting/art  Pt goals: strengthen core and back, prevent episodes of pain    Objective      Lumbar  Isometric Testing on Med X equipment: Testing administered by PT    Test Initial Baseline Midpoint Final   Date 10/18/24 11/25/24    ROM 0-54 deg 0-57 deg    Max Peak Torque 227  251    Min Peak Torque 122  130    Flex/Ext Ratio 1.9:1 1.9:1    % variance  normative data -30% -21%    % change from initial test N/A visit 1 +14%            Outcomes: intake score  OUTCOMES SELECTION:   Program Patient Outcome Measures     Oswestry Score:  3/50 = 6% disability      AQoL Score:  2/36 = 1%  disability          Visit 11 Score: 4% Oswestry (2 raw score), AQoL 1  Discharge Score:  Goal Score: 0%     Treatment     Eidustin received the treatments listed below:        Medical MedX Treatment as follows:    MedX exercise started day 2:  Patient received neuromuscular education for 15 minutes via participation on the Medical MedX Machine. Therapist assisted patient in isolating and engaging spinal stabilization musculature in order to improve functional ability and postural control. Patient performed exercise with therapist guidance in order to accurately use pacer function, avoid valsalva, and optimally exert effort within a safe and effective range via the Bianca Exertion Rating Scale. Patient instructed to perform at a midrange of exertion and to complete 15-20 repetitions within appropriate split time, with proper technique, and while maintaining safety.           12/23/2024    12:12 PM   [facility]   Visit Number 16   VAS Pain Rating 0   Treadmill Time (in min.) 10 min   Extension in Lying 20   Extension in Standing 10   Lumbar Weight 69 lbs   Repetitions 20   Rating of Perceived Exertion 5   Ice - Z Lie (in min.) 10            Eidustin participated in neuromuscular re-education activities to improve balance, coordination, proprioception, motor control and/or posture for 00 minutes. The following activities were included:      Eilan participated in therapeutic exercises to develop strength, endurance, ROM, flexibility, posture, and core stabilization for 40 minutes including:   Prone on elbows 2 min  Press ups 2/10  Bridging 2/10  Standing extension x10  Hamstring stretch 3x 30 sec ea  Piriformis stretch 3x 30 sec ea  Gluteus stretch 3x 30 sec ea      Peripheral muscle strengthening which included one set of 15-20 repetitions at a slow and controlled 10-13 second per rep pace focused on strengthening supporting musculature in order to improve body mechanics and functional mobility. Patient and therapist  focused on proper form during treatment to ensure optimal strengthening of each targeted muscle group.  Machines utilized included:Torso rotation, Leg Ext, Leg Curl, Chest Press, Rowing, Triceps, Biceps, Hip Abd, Hip Add, and Leg Press      Eilan participated in dynamic functional therapeutic activities to improve functional performance and simulate household and community activities for 00  minutes. The following activities were included:      Eidustin received manual therapy techniques for 00  minutes. The following activities were included:      Pt given cold pack for 10 minutes to low back in Z-lie.    Patient Education and Home Exercises     Home exercises include:  Prone on elbows 2 min  Press ups 2/10  Bridging 2/10  Standing extension x10  Hamstring stretch  Piriformis stretch  Glute stretch  Cardio program (V5): - 10/28/24  Lifting education (V11): - 12/2/24  Posture/Lumbar roll: instruct visit 1  [employer] Discharge handout (date given): -  Equipment at home/gym membership: no    Education provided:   - PT role and POC  - HEP  - instruct in improved sitting posture, use of lumbar support and frequent extension throughout day    Written Home Exercises Provided: yes.  Exercises were reviewed and Delma was able to demonstrate them prior to the end of the session.  Eilan demonstrated good  understanding of the education provided.     See EMR under Patient Instructions for exercises provided prior visit.    Assessment     Continued to reinforce posture and body mechanics and daily stretching to best manage his pain. Discussed wellness at completion of [facility] program to transition to independence.Tolerated increased resistance on MEDX with appropriate perceived exertion and without pain.    Reassessment 11/25/24:  Patient has attended 11 visits at [facility] which included MD evaluation, PT evaluation with isometric testing, and physical therapy treatment including HEP instruction, education,  aerobic activity, dynamic strengthening on MedX equipment for the spine, and whole body strengthening on MedX equipment with increasing resistance. Patient demonstrates increased ability to reduce symptoms, improve posture, improve ROM, and improve strength, as stated below:    -Improved posture, he is using lumbar roll  -Improved lumbar ROM, 0-54 degrees initially on MedX test and 0-57 degrees currently .  -Improved strength at each test point on lumbar MedX isometric test with 14% average improvement noted with reduced pain noted by patient.  -Initial outcome tool score of 6% and current outcome tool score 4% indicating reduced pain and improved function.        Patient is making good progress towards established goals.  Pt will continue to benefit from skilled outpatient physical therapy to address the deficits stated in the impairment chart, provide pt/family education and to maximize pt's level of independence in the home and community environment.     Anticipated Barriers for therapy: none  Pt's spiritual, cultural and educational needs considered and pt agreeable to plan of care and goals as stated below:     Goals:   Short term goals:  6 weeks or 10 visits   - Pt will demonstrate increased lumbar MedX ROM by at least 3 degrees from the initial ROM value with improvements noted in functional ROM and ability to perform ADLs. Appropriate and Ongoing Met 11/25/24  - Pt will demonstrate increased MedX average isometric strength value by 20% from initial test resulting in improved ability to perform bending, lifting, and carrying activities safely, confidently. Appropriate and Ongoing Progressing  - Pt will report a reduction in worst pain score by 1-2 points for improved tolerance for bending. Appropriate and Ongoing Met 11/25/24  - Pt able to perform HEP correctly with minimal cueing or supervision from therapist to encourage independent management of symptoms. Appropriate and Ongoing Met 11/25/24     Long term  goals: 10 weeks or 20 visits   - Pt will demonstrate increased lumbar MedX ROM by at least 6 degrees from initial ROM value, resulting in improved ability to perform functional forward bending while standing and sitting. Appropriate and Ongoing Progressing  - Pt will demonstrate increased MedX average isometric strength value by 30% from initial test resulting in improved ability to perform bending, lifting, and carrying activities safely and confidently. Appropriate and Ongoing Progressing  - Pt to demonstrate ability to independently control and reduce their pain through posture positioning and mechanical movements throughout a typical day. Appropriate and Ongoing Progressing  - Pt will demonstrate reduced pain and improved functional outcomes as reported on the Oswestry Disability Index by reaching a score of 2% or less in order to demonstrate subjective improvement in pt's condition. . Appropriate and Ongoing Progressing  - Pt will demonstrate independence with the HEP at discharge. Appropriate and Ongoing  - Pt will strengthen core and back, prevent episodes of pain(patient goal) Appropriate and Ongoing       Plan     Continue with established Plan of Care towards established PT goals.     Therapist: Senia Machuca, PT  12/23/2024

## 2025-01-02 ENCOUNTER — CLINICAL SUPPORT (OUTPATIENT)
Dept: REHABILITATION | Facility: HOSPITAL | Age: 40
End: 2025-01-02
Payer: COMMERCIAL

## 2025-01-02 DIAGNOSIS — M54.50 CHRONIC BILATERAL LOW BACK PAIN WITHOUT SCIATICA: Primary | ICD-10-CM

## 2025-01-02 DIAGNOSIS — R29.898 DECREASED STRENGTH OF TRUNK AND BACK: ICD-10-CM

## 2025-01-02 DIAGNOSIS — G89.29 CHRONIC BILATERAL LOW BACK PAIN WITHOUT SCIATICA: Primary | ICD-10-CM

## 2025-01-02 DIAGNOSIS — M25.69 DECREASED ROM OF TRUNK AND BACK: ICD-10-CM

## 2025-01-02 PROCEDURE — 97750 PHYSICAL PERFORMANCE TEST: CPT | Mod: 32,PO | Performed by: PHYSICAL THERAPIST

## 2025-01-02 NOTE — PROGRESS NOTES
[facility]  Physical Therapy Treatment Note     Name: Delma Christianson  Clinic Number: [id number]    Therapy Diagnosis:   Encounter Diagnoses   Name Primary?    Chronic bilateral low back pain without sciatica Yes    Decreased ROM of trunk and back     Decreased strength of trunk and back              Physician: Tameka Fischer PA-C    Visit Date: 1/2/2025    Physician Orders: PT Eval and Treat- Healthy Back  Medical Diagnosis from Referral: chronic back pain, lumbar back pain  Evaluation Date: 10/9/2024  Authorization Period Expiration: 12/31/25  Plan of Care Expiration: 12/18/2024  Reassessment 11/25/24  Reassessment Due: visit 20  Visit # / Visits authorized: 1/20 (+16 prior auth)  MedX testing visit 2  PTA Visit #: 0/5     Time In: 11:05AM  Time Out: 12:05pm  Total Billable Time: 50 minutes  INSURANCE and OUTCOMES: Program Benefit Group with Lumbar Outcomes (Oswestry and AQoL) 2/3    Precautions: standard    Pattern of pain determined: Pattern 1 PEP    Subjective     Delma Christianson  he has no LBP currently. He and child have been ill. He has been holding and lifting baby more than usual without back pain.  Patient reports their pain to be 0/10 on a 0-10 scale with 0 being no pain and 10 being the worst pain imaginable.  Pain Location:  low back worse on right      Work and leisure: Occupation: Peds ER MD  Leisure: garden, watch baby, play golf, painting/art  Pt goals: strengthen core and back, prevent episodes of pain    Objective      Lumbar  Isometric Testing on Med X equipment: Testing administered by PT    Test Initial Baseline Midpoint Final   Date 10/18/24 11/25/24    ROM 0-54 deg 0-57 deg    Max Peak Torque 227  251    Min Peak Torque 122  130    Flex/Ext Ratio 1.9:1 1.9:1    % variance  normative data -30% -21%    % change from initial test N/A visit 1 +14%            Outcomes: intake score  OUTCOMES SELECTION:   Program Patient Outcome Measures     Oswestry  Score:  3/50 = 6% disability      AQoL Score:  2/36 = 1% disability          Visit 11 Score: 4% Oswestry (2 raw score), AQoL 1  Discharge Score:  Goal Score: 0%     Treatment     Eidustin received the treatments listed below:        Medical MedX Treatment as follows:    MedX exercise started day 2:  Patient received neuromuscular education for 15 minutes via participation on the Medical MedX Machine. Therapist assisted patient in isolating and engaging spinal stabilization musculature in order to improve functional ability and postural control. Patient performed exercise with therapist guidance in order to accurately use pacer function, avoid valsalva, and optimally exert effort within a safe and effective range via the Bianca Exertion Rating Scale. Patient instructed to perform at a midrange of exertion and to complete 15-20 repetitions within appropriate split time, with proper technique, and while maintaining safety.           1/2/2025    11:27 AM   [facility]   Visit Number 17   VAS Pain Rating 0   Treadmill Time (in min.) 10 min   Extension in Lying 20   Extension in Standing 10   Lumbar Weight 69 lbs   Repetitions 19   Rating of Perceived Exertion 6   Ice - Z Lie (in min.) 10       Eidustin participated in neuromuscular re-education activities to improve balance, coordination, proprioception, motor control and/or posture for 00 minutes. The following activities were included:      Eidustin participated in therapeutic exercises to develop strength, endurance, ROM, flexibility, posture, and core stabilization for 40 minutes including:     Prone on elbows 2 min  Press ups 2/10  Bridging 2/10  Standing extension x10  Hamstring stretch 3x 30 sec ea  Piriformis stretch 3x 30 sec ea  Gluteus stretch 3x 30 sec ea      Peripheral muscle strengthening which included one set of 15-20 repetitions at a slow and controlled 10-13 second per rep pace focused on strengthening supporting musculature in order to improve body  mechanics and functional mobility. Patient and therapist focused on proper form during treatment to ensure optimal strengthening of each targeted muscle group.  Machines utilized included:Torso rotation, Leg Ext, Leg Curl, Chest Press, Rowing, Triceps, Biceps, Hip Abd, Hip Add, and Leg Press      Eilan participated in dynamic functional therapeutic activities to improve functional performance and simulate household and community activities for 00  minutes. The following activities were included:      Eilan received manual therapy techniques for 00  minutes. The following activities were included:      Pt given cold pack for 10 minutes to low back in Z-lie.    Patient Education and Home Exercises     Home exercises include:  Prone on elbows 2 min  Press ups 2/10  Bridging 2/10  Standing extension x10  Hamstring stretch  Piriformis stretch  Glute stretch  Cardio program (V5): - 10/28/24  Lifting education (V11): - 12/2/24  Posture/Lumbar roll: instruct visit 1  [employer] Discharge handout (date given): -  Equipment at home/gym membership: no    Education provided:   - PT role and POC  - HEP  - instruct in improved sitting posture, use of lumbar support and frequent extension throughout day    Written Home Exercises Provided: yes.  Exercises were reviewed and Eilan was able to demonstrate them prior to the end of the session.  Eilan demonstrated good  understanding of the education provided.     See EMR under Patient Instructions for exercises provided prior visit.    Assessment     Initiating instruction in setting machines for himself as we progress toward independence. Discussed wellness at completion of HB program with PT. Continued to reinforce posture and body mechanics.  Reassessment 11/25/24:  Patient has attended 11 visits at [facility] which included MD evaluation, PT evaluation with isometric testing, and physical therapy treatment including HEP instruction, education, aerobic activity, dynamic  strengthening on MedX equipment for the spine, and whole body strengthening on MedX equipment with increasing resistance. Patient demonstrates increased ability to reduce symptoms, improve posture, improve ROM, and improve strength, as stated below:    -Improved posture, he is using lumbar roll  -Improved lumbar ROM, 0-54 degrees initially on MedX test and 0-57 degrees currently .  -Improved strength at each test point on lumbar MedX isometric test with 14% average improvement noted with reduced pain noted by patient.  -Initial outcome tool score of 6% and current outcome tool score 4% indicating reduced pain and improved function.        Patient is making good progress towards established goals.  Pt will continue to benefit from skilled outpatient physical therapy to address the deficits stated in the impairment chart, provide pt/family education and to maximize pt's level of independence in the home and community environment.     Anticipated Barriers for therapy: none  Pt's spiritual, cultural and educational needs considered and pt agreeable to plan of care and goals as stated below:     Goals:   Short term goals:  6 weeks or 10 visits   - Pt will demonstrate increased lumbar MedX ROM by at least 3 degrees from the initial ROM value with improvements noted in functional ROM and ability to perform ADLs. Appropriate and Ongoing Met 11/25/24  - Pt will demonstrate increased MedX average isometric strength value by 20% from initial test resulting in improved ability to perform bending, lifting, and carrying activities safely, confidently. Appropriate and Ongoing Progressing  - Pt will report a reduction in worst pain score by 1-2 points for improved tolerance for bending. Appropriate and Ongoing Met 11/25/24  - Pt able to perform HEP correctly with minimal cueing or supervision from therapist to encourage independent management of symptoms. Appropriate and Ongoing Met 11/25/24     Long term goals: 10 weeks or 20  visits   - Pt will demonstrate increased lumbar MedX ROM by at least 6 degrees from initial ROM value, resulting in improved ability to perform functional forward bending while standing and sitting. Appropriate and Ongoing Progressing  - Pt will demonstrate increased MedX average isometric strength value by 30% from initial test resulting in improved ability to perform bending, lifting, and carrying activities safely and confidently. Appropriate and Ongoing Progressing  - Pt to demonstrate ability to independently control and reduce their pain through posture positioning and mechanical movements throughout a typical day. Appropriate and Ongoing Progressing  - Pt will demonstrate reduced pain and improved functional outcomes as reported on the Oswestry Disability Index by reaching a score of 2% or less in order to demonstrate subjective improvement in pt's condition. . Appropriate and Ongoing Progressing  - Pt will demonstrate independence with the HEP at discharge. Appropriate and Ongoing  - Pt will strengthen core and back, prevent episodes of pain(patient goal) Appropriate and Ongoing       Plan     Continue with established Plan of Care towards established PT goals.     Therapist: Senia Machuca, PT  1/2/2025

## 2025-01-06 ENCOUNTER — CLINICAL SUPPORT (OUTPATIENT)
Dept: REHABILITATION | Facility: HOSPITAL | Age: 40
End: 2025-01-06
Payer: COMMERCIAL

## 2025-01-06 DIAGNOSIS — M54.50 CHRONIC BILATERAL LOW BACK PAIN WITHOUT SCIATICA: Primary | ICD-10-CM

## 2025-01-06 DIAGNOSIS — R29.898 DECREASED STRENGTH OF TRUNK AND BACK: ICD-10-CM

## 2025-01-06 DIAGNOSIS — G89.29 CHRONIC BILATERAL LOW BACK PAIN WITHOUT SCIATICA: Primary | ICD-10-CM

## 2025-01-06 DIAGNOSIS — M25.69 DECREASED ROM OF TRUNK AND BACK: ICD-10-CM

## 2025-01-06 PROCEDURE — 97750 PHYSICAL PERFORMANCE TEST: CPT | Mod: 32,PO

## 2025-01-06 NOTE — PROGRESS NOTES
[facility]  Physical Therapy Treatment Note     Name: Delma Christianson  Clinic Number: [id number]    Therapy Diagnosis:   Encounter Diagnoses   Name Primary?    Chronic bilateral low back pain without sciatica Yes    Decreased ROM of trunk and back     Decreased strength of trunk and back            Physician: Tameka Fischer PA-C    Visit Date: 1/6/2025    Physician Orders: PT Eval and Treat- Healthy Back  Medical Diagnosis from Referral: chronic back pain, lumbar back pain  Evaluation Date: 10/9/2024  Authorization Period Expiration: 12/31/25  Plan of Care Expiration: 12/18/2024  Reassessment 11/25/24  Reassessment Due: visit 20  Visit # / Visits authorized: 2/20 (+16 prior auth)  MedX testing visit 2  PTA Visit #: 1/5     Time In: 11:00AM  Time Out: 12:05pm  Total Billable Time: 55 minutes  INSURANCE and OUTCOMES: Program Benefit Group with Lumbar Outcomes (Oswestry and AQoL) 2/3    Precautions: standard    Pattern of pain determined: Pattern 1 PEP    Subjective     Delma Christianson  he has no LBP currently and has been feel good without pain.   Patient reports their pain to be 0/10 on a 0-10 scale with 0 being no pain and 10 being the worst pain imaginable.  Pain Location:  low back worse on right      Work and leisure: Occupation: Peds ER MD  Leisure: garden, watch baby, play golf, painting/art  Pt goals: strengthen core and back, prevent episodes of pain    Objective      Lumbar  Isometric Testing on Med X equipment: Testing administered by PT    Test Initial Baseline Midpoint Final   Date 10/18/24 11/25/24    ROM 0-54 deg 0-57 deg    Max Peak Torque 227  251    Min Peak Torque 122  130    Flex/Ext Ratio 1.9:1 1.9:1    % variance  normative data -30% -21%    % change from initial test N/A visit 1 +14%            Outcomes: intake score  OUTCOMES SELECTION:   Program Patient Outcome Measures     Oswestry Score:  3/50 = 6% disability      AQoL Score:  2/36 = 1%  disability          Visit 11 Score: 4% Oswestry (2 raw score), AQoL 1  Discharge Score:  Goal Score: 0%     Treatment     Eidustin received the treatments listed below:        Medical MedX Treatment as follows:    MedX exercise started day 2:  Patient received neuromuscular education for 15 minutes via participation on the Medical MedX Machine. Therapist assisted patient in isolating and engaging spinal stabilization musculature in order to improve functional ability and postural control. Patient performed exercise with therapist guidance in order to accurately use pacer function, avoid valsalva, and optimally exert effort within a safe and effective range via the Bianca Exertion Rating Scale. Patient instructed to perform at a midrange of exertion and to complete 15-20 repetitions within appropriate split time, with proper technique, and while maintaining safety.             1/6/2025    11:15 AM   [facility]   Visit Number 18   VAS Pain Rating 0   Treadmill Time (in min.) 10 min   Extension in Lying 20   Extension in Standing 10   Lumbar Weight 69 lbs   Repetitions 20   Rating of Perceived Exertion 6   Ice - Z Lie (in min.) 10      Eidustin participated in neuromuscular re-education activities to improve balance, coordination, proprioception, motor control and/or posture for 00 minutes. The following activities were included:      Eilan participated in therapeutic exercises to develop strength, endurance, ROM, flexibility, posture, and core stabilization for 40 minutes including:     Prone on elbows 2 min  Press ups 2/10  Bridging 2/10  Standing extension x10  Hamstring stretch 3x 30 sec ea  Piriformis stretch 3x 30 sec ea  Gluteus stretch 3x 30 sec ea      Peripheral muscle strengthening which included one set of 15-20 repetitions at a slow and controlled 10-13 second per rep pace focused on strengthening supporting musculature in order to improve body mechanics and functional mobility. Patient and therapist  focused on proper form during treatment to ensure optimal strengthening of each targeted muscle group.  Machines utilized included:Torso rotation, Leg Ext, Leg Curl, Chest Press, Rowing, Triceps, Biceps, Hip Abd, Hip Add, and Leg Press      Eilan participated in dynamic functional therapeutic activities to improve functional performance and simulate household and community activities for 00  minutes. The following activities were included:      Eilan received manual therapy techniques for 00  minutes. The following activities were included:      Pt given cold pack for 10 minutes to low back in Z-lie.    Patient Education and Home Exercises     Home exercises include:  Prone on elbows 2 min  Press ups 2/10  Bridging 2/10  Standing extension x10  Hamstring stretch  Piriformis stretch  Glute stretch  Cardio program (V5): - 10/28/24  Lifting education (V11): - 12/2/24  Posture/Lumbar roll: instruct visit 1  [employer] Discharge handout (date given): -  Equipment at home/gym membership: no    Education provided:   - PT role and POC  - HEP  - instruct in improved sitting posture, use of lumbar support and frequent extension throughout day    Written Home Exercises Provided: yes.  Exercises were reviewed and Eidustin was able to demonstrate them prior to the end of the session.  Eilan demonstrated good  understanding of the education provided.     See EMR under Patient Instructions for exercises provided prior visit.    Assessment   Pt able to progress with reps on lumbar machine and he maintained most peripheral machines due to becoming difficult. Overall, pt feeling better, mindful of posture and complaint with HEP.   Reassessment 11/25/24:  Patient has attended 11 visits at [facility] which included MD evaluation, PT evaluation with isometric testing, and physical therapy treatment including HEP instruction, education, aerobic activity, dynamic strengthening on MedX equipment for the spine, and whole body  strengthening on MedX equipment with increasing resistance. Patient demonstrates increased ability to reduce symptoms, improve posture, improve ROM, and improve strength, as stated below:    -Improved posture, he is using lumbar roll  -Improved lumbar ROM, 0-54 degrees initially on MedX test and 0-57 degrees currently .  -Improved strength at each test point on lumbar MedX isometric test with 14% average improvement noted with reduced pain noted by patient.  -Initial outcome tool score of 6% and current outcome tool score 4% indicating reduced pain and improved function.        Patient is making good progress towards established goals.  Pt will continue to benefit from skilled outpatient physical therapy to address the deficits stated in the impairment chart, provide pt/family education and to maximize pt's level of independence in the home and community environment.     Anticipated Barriers for therapy: none  Pt's spiritual, cultural and educational needs considered and pt agreeable to plan of care and goals as stated below:     Goals:   Short term goals:  6 weeks or 10 visits   - Pt will demonstrate increased lumbar MedX ROM by at least 3 degrees from the initial ROM value with improvements noted in functional ROM and ability to perform ADLs. Appropriate and Ongoing Met 11/25/24  - Pt will demonstrate increased MedX average isometric strength value by 20% from initial test resulting in improved ability to perform bending, lifting, and carrying activities safely, confidently. Appropriate and Ongoing Progressing  - Pt will report a reduction in worst pain score by 1-2 points for improved tolerance for bending. Appropriate and Ongoing Met 11/25/24  - Pt able to perform HEP correctly with minimal cueing or supervision from therapist to encourage independent management of symptoms. Appropriate and Ongoing Met 11/25/24     Long term goals: 10 weeks or 20 visits   - Pt will demonstrate increased lumbar MedX ROM by at  least 6 degrees from initial ROM value, resulting in improved ability to perform functional forward bending while standing and sitting. Appropriate and Ongoing Progressing  - Pt will demonstrate increased MedX average isometric strength value by 30% from initial test resulting in improved ability to perform bending, lifting, and carrying activities safely and confidently. Appropriate and Ongoing Progressing  - Pt to demonstrate ability to independently control and reduce their pain through posture positioning and mechanical movements throughout a typical day. Appropriate and Ongoing Progressing  - Pt will demonstrate reduced pain and improved functional outcomes as reported on the Oswestry Disability Index by reaching a score of 2% or less in order to demonstrate subjective improvement in pt's condition. . Appropriate and Ongoing Progressing  - Pt will demonstrate independence with the HEP at discharge. Appropriate and Ongoing  - Pt will strengthen core and back, prevent episodes of pain(patient goal) Appropriate and Ongoing       Plan     Continue with established Plan of Care towards established PT goals.     Therapist: Laurie Perez, PTA  1/6/2025

## 2025-01-17 ENCOUNTER — CLINICAL SUPPORT (OUTPATIENT)
Dept: REHABILITATION | Facility: HOSPITAL | Age: 40
End: 2025-01-17
Payer: COMMERCIAL

## 2025-01-17 DIAGNOSIS — R29.898 DECREASED STRENGTH OF TRUNK AND BACK: ICD-10-CM

## 2025-01-17 DIAGNOSIS — M54.50 CHRONIC BILATERAL LOW BACK PAIN WITHOUT SCIATICA: Primary | ICD-10-CM

## 2025-01-17 DIAGNOSIS — G89.29 CHRONIC BILATERAL LOW BACK PAIN WITHOUT SCIATICA: Primary | ICD-10-CM

## 2025-01-17 DIAGNOSIS — M25.69 DECREASED ROM OF TRUNK AND BACK: ICD-10-CM

## 2025-01-17 PROCEDURE — 97750 PHYSICAL PERFORMANCE TEST: CPT | Mod: 32,PO | Performed by: PHYSICAL THERAPIST

## 2025-01-17 NOTE — PATIENT INSTRUCTIONS
"HEALTHY BACK TOOLS        KEEP YOUR SPINE FEELING FINE   HEALTHY HABITS   Do your "GO TO" stretches 2/day   Get a good night's REST   Watch your POSTURE in sitting/standing Drink PLENTY of water   Use a lumbar roll Eat LOTS of fruits & vegetables   GET UP often (walk and/or stretch) Manage your STRESS   Make your workplace IDEAL FOR YOU  Don't smoke   Lift correctly EXERCISE   Practice positive self talk-talk to yourself kindly like you would your best friend  Remember the value of mindfulness and breathing relaxation tools for self care                                                                                                                                 QR code for breathing    WHAT TO DO WHEN SYMPTOMS FLARE UP  Back and neck pain may occasionally flare up.  If you experience a flare   up, remember your tools. Be encouraged, by remembering that flare-ups will   usually pass.   My Tools:  ~Use your "Go To" Stretches/Positions   ~Keep Moving-pain usually gets better if you move  ~Z lie (with or without ice)  10 min several times a day until symptoms reduce  ~Slowly resume normal activities   ~Practice Deep Breathing and Relaxation techniques                                                 MY EXERCISE PLAN  GO TO STRETCHES  2/day (like brushing your teeth) STRENGTHENING  2-3 times/week CARDIO PROGRAM  30-45 min/3-5x/week   Prone on elbows 2 min Gym exercise vs exercise from health  walking   Press ups 2 sets of 10 Bridging 2 sets of 10    Standing extension 10x + 1-2 x every time you get up from sitting or bending     Hamstring stretch 3x 30 sec ea     Glute stretch 3x 30 sec ea     Piriformis stretch 3x 30 sec ea         Prone Superman    While lying face down, lift right leg and opposite arm approximately 3 inches from floor, keeping knee locked.    Return to start position. Then repeat with opposite side. Hold 3-5 seconds. Repeat 2 sets of 10.    *You may place pillow under pelvis for more comfort. " "      PRONE SUPERMAN    While lying face down, slowly raise your arms and legs upward off the ground. Then lower slowly back to the ground.  -Either hold 3-5 seconds and repeat 2 sets of 10  OR  - Lift and hold 30 seconds. Repeat 3x.      Prone I's w/ Trunk Lift    Lie on your stomach (prone) with your arms at your side at an "I" angle. Next, while keeping a chin tuck, lift both arms off the mat and above your body, then lift your chest off the mat. Hold for 3-5 seconds, then lower the chest and arms slowly back to mat. Can be done with or without weights.  2 sets of 10.    "

## 2025-01-17 NOTE — PROGRESS NOTES
[facility]  Physical Therapy Treatment Note     Name: Delma Christianson  Clinic Number: [id number]    Therapy Diagnosis:   Encounter Diagnoses   Name Primary?    Chronic bilateral low back pain without sciatica Yes    Decreased ROM of trunk and back     Decreased strength of trunk and back            Physician: Tameka Fischer PA-C    Visit Date: 1/17/2025    Physician Orders: PT Eval and Treat- [facility]  Medical Diagnosis from Referral: chronic back pain, lumbar back pain  Evaluation Date: 10/9/2024  Authorization Period Expiration: 12/31/25  Plan of Care Expiration: 12/18/2024  Reassessment 11/25/24  Reassessment Due: visit 20  Visit # / Visits authorized: 3/20 (+16 prior auth)  MedX testing visit 2  PTA Visit #: 0/5     Time In: 10:00AM  Time Out: 10:57AM  Total Billable Time: 57 minutes  INSURANCE and OUTCOMES: Program Benefit Group with Lumbar Outcomes (Oswestry and AQoL) 2/3    Precautions: standard    Pattern of pain determined: Pattern 1 PEP    Subjective     Delma Christianson  he is without pain currently.  Patient reports their pain to be 0/10 on a 0-10 scale with 0 being no pain and 10 being the worst pain imaginable.  Pain Location:  low back worse on right      Work and leisure: Occupation: Peds ER MD  Leisure: garden, watch baby, play golf, painting/art  Pt goals: strengthen core and back, prevent episodes of pain    Objective      Lumbar  Isometric Testing on Med X equipment: Testing administered by PT    Test Initial Baseline Midpoint Final   Date 10/18/24 11/25/24    ROM 0-54 deg 0-57 deg    Max Peak Torque 227  251    Min Peak Torque 122  130    Flex/Ext Ratio 1.9:1 1.9:1    % variance  normative data -30% -21%    % change from initial test N/A visit 1 +14%            Outcomes: intake score  OUTCOMES SELECTION:   Program Patient Outcome Measures     Oswestry Score:  3/50 = 6% disability      AQoL Score:  2/36 = 1% disability          Visit 11 Score:  4% Oswestry (2 raw score), AQoL 1  Discharge Score:  Goal Score: 0%     Treatment     Eidustin received the treatments listed below:        Medical MedX Treatment as follows:    MedX exercise started day 2:  Patient received neuromuscular education for 15 minutes via participation on the Medical MedX Machine. Therapist assisted patient in isolating and engaging spinal stabilization musculature in order to improve functional ability and postural control. Patient performed exercise with therapist guidance in order to accurately use pacer function, avoid valsalva, and optimally exert effort within a safe and effective range via the Bianca Exertion Rating Scale. Patient instructed to perform at a midrange of exertion and to complete 15-20 repetitions within appropriate split time, with proper technique, and while maintaining safety.           1/17/2025    10:06 AM   [facility]   Visit Number 19   VAS Pain Rating 0   Treadmill Time (in min.) 10 min   Extension in Lying 20   Extension in Standing 10   Lumbar Weight 69 lbs   Repetitions 20   Rating of Perceived Exertion 3   Ice - Z Lie (in min.) 10          Eidustin participated in neuromuscular re-education activities to improve balance, coordination, proprioception, motor control and/or posture for 00 minutes. The following activities were included:      Eidustin participated in therapeutic exercises to develop strength, endurance, ROM, flexibility, posture, and core stabilization for 42 minutes including:     Prone on elbows 2 min  Press ups 2/10  Bridging 2/10  Standing extension x10  Hamstring stretch 3x 30 sec ea  Piriformis stretch 3x 30 sec ea  Gluteus stretch 3x 30 sec ea    Peripheral muscle strengthening which included one set of 15-20 repetitions at a slow and controlled 10-13 second per rep pace focused on strengthening supporting musculature in order to improve body mechanics and functional mobility. Patient and therapist focused on proper form during treatment  "to ensure optimal strengthening of each targeted muscle group.  Machines utilized included:Torso rotation, Leg Ext, Leg Curl, Chest Press, Rowing, Triceps, Biceps, Hip Abd, Hip Add, and Leg Press      Eilan participated in dynamic functional therapeutic activities to improve functional performance and simulate household and community activities for 00  minutes. The following activities were included:      Eilan received manual therapy techniques for 00  minutes. The following activities were included:      Pt given cold pack for 10 minutes to low back in Z-lie.    Patient Education and Home Exercises     Home exercises include:  Prone on elbows 2 min  Press ups 2/10  Bridging 2/10  Standing extension x10  Hamstring stretch  Piriformis stretch  Glute stretch  Cardio program (V5): - 10/28/24  Lifting education (V11): - 12/2/24  Posture/Lumbar roll: instruct visit 1  Fridge Magnet Discharge handout (date given): - 1/17/25  Equipment at home/gym membership: no    Education provided:   - PT role and POC  - HEP  - instruct in improved sitting posture, use of lumbar support and frequent extension throughout day    Written Home Exercises Provided: yes.  Exercises were reviewed and Delma was able to demonstrate them prior to the end of the session.  Eilan demonstrated good  understanding of the education provided.     See EMR under Patient Instructions for exercises provided prior visit.    Assessment     Instructed pt in "Tools to Keep his Spine Feeling Fine" and given "fridge magnet" handout for reference. Also instructed pt in prone back strengthening exercises as options to increase back strength if not utilizing back extension machine at gym. Will retest isometric back strength next visit and transition to wellness.    Reassessment 11/25/24:  Patient has attended 11 visits at [facility] which included MD evaluation, PT evaluation with isometric testing, and physical therapy treatment including HEP instruction, " education, aerobic activity, dynamic strengthening on MedX equipment for the spine, and whole body strengthening on MedX equipment with increasing resistance. Patient demonstrates increased ability to reduce symptoms, improve posture, improve ROM, and improve strength, as stated below:    -Improved posture, he is using lumbar roll  -Improved lumbar ROM, 0-54 degrees initially on MedX test and 0-57 degrees currently .  -Improved strength at each test point on lumbar MedX isometric test with 14% average improvement noted with reduced pain noted by patient.  -Initial outcome tool score of 6% and current outcome tool score 4% indicating reduced pain and improved function.        Patient is making good progress towards established goals.  Pt will continue to benefit from skilled outpatient physical therapy to address the deficits stated in the impairment chart, provide pt/family education and to maximize pt's level of independence in the home and community environment.     Anticipated Barriers for therapy: none  Pt's spiritual, cultural and educational needs considered and pt agreeable to plan of care and goals as stated below:     Goals:   Short term goals:  6 weeks or 10 visits   - Pt will demonstrate increased lumbar MedX ROM by at least 3 degrees from the initial ROM value with improvements noted in functional ROM and ability to perform ADLs. Appropriate and Ongoing Met 11/25/24  - Pt will demonstrate increased MedX average isometric strength value by 20% from initial test resulting in improved ability to perform bending, lifting, and carrying activities safely, confidently. Appropriate and Ongoing Progressing  - Pt will report a reduction in worst pain score by 1-2 points for improved tolerance for bending. Appropriate and Ongoing Met 11/25/24  - Pt able to perform HEP correctly with minimal cueing or supervision from therapist to encourage independent management of symptoms. Appropriate and Ongoing Met 11/25/24      Long term goals: 10 weeks or 20 visits   - Pt will demonstrate increased lumbar MedX ROM by at least 6 degrees from initial ROM value, resulting in improved ability to perform functional forward bending while standing and sitting. Appropriate and Ongoing Progressing  - Pt will demonstrate increased MedX average isometric strength value by 30% from initial test resulting in improved ability to perform bending, lifting, and carrying activities safely and confidently. Appropriate and Ongoing Progressing  - Pt to demonstrate ability to independently control and reduce their pain through posture positioning and mechanical movements throughout a typical day. Appropriate and Ongoing Progressing  - Pt will demonstrate reduced pain and improved functional outcomes as reported on the Oswestry Disability Index by reaching a score of 2% or less in order to demonstrate subjective improvement in pt's condition. . Appropriate and Ongoing Progressing  - Pt will demonstrate independence with the HEP at discharge. Appropriate and Ongoing  - Pt will strengthen core and back, prevent episodes of pain(patient goal) Appropriate and Ongoing       Plan     Continue with established Plan of Care towards established PT goals.     Therapist: Senia Machuca, PT  1/17/2025

## 2025-01-30 ENCOUNTER — CLINICAL SUPPORT (OUTPATIENT)
Dept: REHABILITATION | Facility: HOSPITAL | Age: 40
End: 2025-01-30
Payer: COMMERCIAL

## 2025-01-30 DIAGNOSIS — M54.50 CHRONIC BILATERAL LOW BACK PAIN WITHOUT SCIATICA: Primary | ICD-10-CM

## 2025-01-30 DIAGNOSIS — G89.29 CHRONIC BILATERAL LOW BACK PAIN WITHOUT SCIATICA: Primary | ICD-10-CM

## 2025-01-30 DIAGNOSIS — M25.69 DECREASED ROM OF TRUNK AND BACK: ICD-10-CM

## 2025-01-30 DIAGNOSIS — R29.898 DECREASED STRENGTH OF TRUNK AND BACK: ICD-10-CM

## 2025-01-30 PROCEDURE — 97750 PHYSICAL PERFORMANCE TEST: CPT | Mod: 32,PO | Performed by: PHYSICAL THERAPIST

## 2025-01-30 NOTE — PROGRESS NOTES
[facility]  Physical Therapy Treatment Note     Name: Delma Christianson  Clinic Number: [id number]    Therapy Diagnosis:   Encounter Diagnoses   Name Primary?    Chronic bilateral low back pain without sciatica Yes    Decreased ROM of trunk and back     Decreased strength of trunk and back              Physician: Tameka Fischer PA-C    Visit Date: 1/30/2025    Physician Orders: PT Eval and Treat- Healthy Back  Medical Diagnosis from Referral: chronic back pain, lumbar back pain  Evaluation Date: 10/9/2024  Authorization Period Expiration: 12/31/25  Plan of Care Expiration: 1/30/25  Reassessment 11/25/24, 1/30/25  Reassessment Due: discharge  Visit # / Visits authorized: 4/20 (+16 prior auth)  MedX testing visit 2  PTA Visit #: 0/5     Time In: 11:00AM  Time Out: 12:15AM  Total Billable Time: 65 minutes  INSURANCE and OUTCOMES: Program Benefit Group with Lumbar Outcomes (Oswestry and AQoL) 2/3    Precautions: standard    Pattern of pain determined: Pattern 1 PEP    Subjective     Delma Christianson  he is without pain currently. He had a twinge of pain yesterday, stating he may have slept wrong.  Patient reports their pain to be 0/10 on a 0-10 scale with 0 being no pain and 10 being the worst pain imaginable.  Pain Location:  low back worse on right      Work and leisure: Occupation: Peds ER MD  Leisure: garden, watch baby, play golf, painting/art  Pt goals: strengthen core and back, prevent episodes of pain    Objective      Lumbar  Isometric Testing on Med X equipment: Testing administered by PT    Test Initial Baseline Midpoint Final   Date 10/18/24 11/25/24 1/30/25   ROM 0-54 deg 0-57 deg 0-60 deg   Max Peak Torque 227  251 251   Min Peak Torque 122  130 184   Flex/Ext Ratio 1.9:1 1.9:1 1.4:1   % variance  normative data -30% -21% -2%   % change from initial test N/A visit 1 +14% +46%           Outcomes: intake score  OUTCOMES SELECTION:   Program Patient Outcome  Measures     Oswestry Score:  3/50 = 6% disability      AQoL Score:  2/36 = 1% disability          Visit 11 Score: 4% Oswestry (2 raw score), AQoL 1  Discharge Score: 0% Oswestry (0 raw score), AQoL 2  Goal Score: 0%     Treatment     Eidustin received the treatments listed below:      Eidustin was seen for performance testing for spinal musculature  to engage spinal musculature correctly for isometric testing within patient's range of motion, with comparison to baseline testing for  15 minutes.  This includes the MedX practice exercise component and practice and standard testing.  Med x dynamic exercise and testing performed with instructions to guide the patient safely through the isometric testing.  Patient informed to perform isometric test correctly, and safely, building best force they safely can and not pushing through pain.  Patient demonstrated understanding of information         Medical MedX Treatment as follows:    MedX exercise started day 2:  Patient received neuromuscular education for 00 minutes via participation on the Medical MedX Machine. Therapist assisted patient in isolating and engaging spinal stabilization musculature in order to improve functional ability and postural control. Patient performed exercise with therapist guidance in order to accurately use pacer function, avoid valsalva, and optimally exert effort within a safe and effective range via the Bianca Exertion Rating Scale. Patient instructed to perform at a midrange of exertion and to complete 15-20 repetitions within appropriate split time, with proper technique, and while maintaining safety.           1/30/2025    11:33 AM   [facility]   Visit Number 20   VAS Pain Rating 0   Treadmill Time (in min.) 10 min   Extension in Lying 20   Extension in Standing 10   Lumbar Flexion 60   Lumbar Extension 0   Lumbar Peak Torque 251 ft. lbs.   Min Torque 184   Test Percent Below Normative Data 2 %   Test Percent Gain in Strength from Initial   46 %   Ice - Z Lie (in min.) 10        Eilan participated in neuromuscular re-education activities to improve balance, coordination, proprioception, motor control and/or posture for 00 minutes. The following activities were included:      Eilan participated in therapeutic exercises to develop strength, endurance, ROM, flexibility, posture, and core stabilization for 50 minutes including:     Prone on elbows 2 min  Press ups 2/10  Bridging 2/10  Standing extension x10  Hamstring stretch 3x 30 sec ea  Piriformis stretch 3x 30 sec ea  Gluteus stretch 3x 30 sec ea    Peripheral muscle strengthening which included one set of 15-20 repetitions at a slow and controlled 10-13 second per rep pace focused on strengthening supporting musculature in order to improve body mechanics and functional mobility. Patient and therapist focused on proper form during treatment to ensure optimal strengthening of each targeted muscle group.  Machines utilized included:Torso rotation, Leg Ext, Leg Curl, Chest Press, Rowing, Triceps, Biceps, Hip Abd, Hip Add, and Leg Press      Eilan participated in dynamic functional therapeutic activities to improve functional performance and simulate household and community activities for 00  minutes. The following activities were included:      Eilan received manual therapy techniques for 00  minutes. The following activities were included:      Pt given cold pack for 10 minutes to low back in Z-lie.    Patient Education and Home Exercises     Home exercises include:  Prone on elbows 2 min  Press ups 2/10  Bridging 2/10  Standing extension x10  Hamstring stretch  Piriformis stretch  Glute stretch  Cardio program (V5): - 10/28/24  Lifting education (V11): - 12/2/24  Posture/Lumbar roll: instruct visit 1  Fridge Magnet Discharge handout (date given): - 1/17/25  Equipment at home/gym membership: no    Education provided:   - PT role and POC  - HEP  - instruct in improved sitting posture, use of lumbar  support and frequent extension throughout day    Written Home Exercises Provided: yes.  Exercises were reviewed and Eilan was able to demonstrate them prior to the end of the session.  Eilan demonstrated good  understanding of the education provided.     See EMR under Patient Instructions for exercises provided prior visit.    Assessment     Reassessment 1/30/25:  Patient retested at visit 20 and shows improvement in: pain,ROM, strength and function  Improved posture, using lumbar roll  Improved lumbar ROM, 0-54 degrees initially on med ex test and 0-60 degrees currently  Improved strength at each test point on lumbar med ex IM test with 46%  average improvement noted with Reduced pain  Noted by patient  Initial outcome tool score of 6% and current outcome tool score of 0% indicating reduced pain and improved function        Reassessment 11/25/24:  Patient has attended 11 visits at [facility] which included MD evaluation, PT evaluation with isometric testing, and physical therapy treatment including HEP instruction, education, aerobic activity, dynamic strengthening on MedX equipment for the spine, and whole body strengthening on MedX equipment with increasing resistance. Patient demonstrates increased ability to reduce symptoms, improve posture, improve ROM, and improve strength, as stated below:    -Improved posture, he is using lumbar roll  -Improved lumbar ROM, 0-54 degrees initially on MedX test and 0-57 degrees currently .  -Improved strength at each test point on lumbar MedX isometric test with 14% average improvement noted with reduced pain noted by patient.  -Initial outcome tool score of 6% and current outcome tool score 4% indicating reduced pain and improved function.        Patient is making good progress towards established goals.  Pt will continue to benefit from skilled outpatient physical therapy to address the deficits stated in the impairment chart, provide pt/family education and to  maximize pt's level of independence in the home and community environment.     Anticipated Barriers for therapy: none  Pt's spiritual, cultural and educational needs considered and pt agreeable to plan of care and goals as stated below:     Goals:   Short term goals:  6 weeks or 10 visits   - Pt will demonstrate increased lumbar MedX ROM by at least 3 degrees from the initial ROM value with improvements noted in functional ROM and ability to perform ADLs. Appropriate and Ongoing Met 11/25/24  - Pt will demonstrate increased MedX average isometric strength value by 20% from initial test resulting in improved ability to perform bending, lifting, and carrying activities safely, confidently. Appropriate and Ongoing Met 1/30/25  - Pt will report a reduction in worst pain score by 1-2 points for improved tolerance for bending. Appropriate and Ongoing Met 11/25/24  - Pt able to perform HEP correctly with minimal cueing or supervision from therapist to encourage independent management of symptoms. Appropriate and Ongoing Met 11/25/24     Long term goals: 10 weeks or 20 visits   - Pt will demonstrate increased lumbar MedX ROM by at least 6 degrees from initial ROM value, resulting in improved ability to perform functional forward bending while standing and sitting. Appropriate and Ongoing Met 1/30/25  - Pt will demonstrate increased MedX average isometric strength value by 30% from initial test resulting in improved ability to perform bending, lifting, and carrying activities safely and confidently. Appropriate and Ongoing Met 1/30/25  - Pt to demonstrate ability to independently control and reduce their pain through posture positioning and mechanical movements throughout a typical day. Appropriate and Ongoing Met 1/30/25  - Pt will demonstrate reduced pain and improved functional outcomes as reported on the Oswestry Disability Index by reaching a score of 2% or less in order to demonstrate subjective improvement in pt's  condition. . Appropriate and Ongoing Met 1/30/25  - Pt will demonstrate independence with the HEP at discharge. Appropriate and Ongoing  - Pt will strengthen core and back, prevent episodes of pain(patient goal) Appropriate and Ongoing       Plan     Patient has attended 20 visits of the [facility] program focusing aerobic training, isometric testing with dynamic strengthening on MedX machine for spine, whole body strengthening on peripheral strengthening equipment, HEP, and patient education. Patient has completed the [facility] Program and is ready to be transitioned into wellness program. Educated on the importance of wellness program and attending weekly in order to maintain strength. Stressed the importance of continuing exercise and maintaining proper body mechanics and ergonomics in order to fully participate in activities of daily living, work, and leisure activities. At this time, patient demonstrates improvement in ability to reduce symptoms, improved posture, improved spinal ROM and improved strength. Discharge handout with HEP given and reviewed all information given. Patient able to demonstrate and verbalize understanding. Patient does not plan to attend wellness and is appropriate for transition.     Therapist: Senia Machuca, ARMANI  1/30/2025    [facility]   Discharge Note    Name: Delma [facility] Number: [id number]    Therapy Diagnosis:   Encounter Diagnoses   Name Primary?    Chronic bilateral low back pain without sciatica Yes    Decreased ROM of trunk and back     Decreased strength of trunk and back      Physician: Tameka Fischer PA-C    Physician Orders: PT Eval and Treat- Healthy [facility]  Medical Diagnosis from Referral: chronic back pain, lumbar back pain  Evaluation Date: 10/9/2024    Date of Last visit: 1/30/25  Total Visits Received: 20    ASSESSMENT      See above    Discharge reason: Other:  Pt has completed [facility] program and is ready to  transition to wellness.    Discharge Oswestry Score: 0%    Goals: met as above    PLAN   This patient is discharged from Physical Therapy      Senia Machuca PT

## 2025-02-05 PROBLEM — R29.898 DECREASED STRENGTH OF TRUNK AND BACK: Status: RESOLVED | Noted: 2024-10-10 | Resolved: 2025-01-30

## 2025-02-05 PROBLEM — M25.69 DECREASED ROM OF TRUNK AND BACK: Status: RESOLVED | Noted: 2024-10-10 | Resolved: 2025-01-30

## 2025-02-05 PROBLEM — M54.50 CHRONIC BILATERAL LOW BACK PAIN WITHOUT SCIATICA: Status: RESOLVED | Noted: 2024-10-10 | Resolved: 2025-01-30

## 2025-02-05 PROBLEM — G89.29 CHRONIC BILATERAL LOW BACK PAIN WITHOUT SCIATICA: Status: RESOLVED | Noted: 2024-10-10 | Resolved: 2025-01-30

## 2025-02-10 ENCOUNTER — HOSPITAL ENCOUNTER (OUTPATIENT)
Dept: RADIOLOGY | Facility: HOSPITAL | Age: 40
Discharge: HOME | End: 2025-02-10
Attending: ORTHOPAEDIC SURGERY
Payer: COMMERCIAL

## 2025-02-10 ENCOUNTER — OFFICE VISIT (OUTPATIENT)
Dept: ORTHOPEDICS | Facility: CLINIC | Age: 40
End: 2025-02-10
Payer: COMMERCIAL

## 2025-02-10 DIAGNOSIS — S62.619A CLOSED FRACTURE OF PROXIMAL PHALANX OF DIGIT OF LEFT HAND, INITIAL ENCOUNTER: Primary | ICD-10-CM

## 2025-02-10 DIAGNOSIS — S62.619A CLOSED FRACTURE OF PROXIMAL PHALANX OF DIGIT OF LEFT HAND, INITIAL ENCOUNTER: ICD-10-CM

## 2025-02-10 PROCEDURE — 73140 X-RAY EXAM OF FINGER(S): CPT | Mod: 26,LT,, | Performed by: RADIOLOGY

## 2025-02-10 PROCEDURE — 73140 X-RAY EXAM OF FINGER(S): CPT | Mod: TC,PO,LT

## 2025-02-10 PROCEDURE — 99204 OFFICE O/P NEW MOD 45 MIN: CPT | Mod: S$GLB,,, | Performed by: ORTHOPAEDIC SURGERY

## 2025-02-10 PROCEDURE — 99999 PR PBB SHADOW E&M-EST. PATIENT-LVL II: CPT | Mod: PBBFAC,,, | Performed by: ORTHOPAEDIC SURGERY

## 2025-02-10 PROCEDURE — 1159F MED LIST DOCD IN RCRD: CPT | Mod: CPTII,S$GLB,, | Performed by: ORTHOPAEDIC SURGERY

## 2025-02-10 RX ORDER — MULTIVITAMIN
1 TABLET ORAL DAILY
COMMUNITY

## 2025-02-10 NOTE — PROGRESS NOTES
2/10/2025    Chief Complaint:  Chief Complaint   Patient presents with    Left Hand - Injury, Pain     Fall - 5 days ago  Thumb Fracture       HPI:  Delma Christianson is a 39 y.o. male, who presents to clinic today has a history of a fall approximately 5 days ago.  He had an axial load to his thumb.  He had pain and swelling which was persistent.  He felt like he may have a fracture.  He has been in a splint.  He is here today for further evaluation    PMHX:  Past Medical History:   Diagnosis Date    Known health problems: none        PSHX:  Past Surgical History:   Procedure Laterality Date    NO PAST SURGERIES         FMHX:  No family history on file.    SOCHX:  Social History     Tobacco Use    Smoking status: Never    Smokeless tobacco: Never   Substance Use Topics    Alcohol use: Not Currently     Alcohol/week: 1.0 standard drink of alcohol     Types: 1 Glasses of wine per week       ALLERGIES:  Patient has no known allergies.    CURRENT MEDICATIONS:  Current Outpatient Medications on File Prior to Visit   Medication Sig Dispense Refill    multivitamin (ONE DAILY MULTIVITAMIN) per tablet Take 1 tablet by mouth once daily.       No current facility-administered medications on file prior to visit.       REVIEW OF SYSTEMS:  Review of Systems   Constitutional: Negative.    HENT: Negative.     Eyes: Negative.    Respiratory: Negative.     Cardiovascular: Negative.    Gastrointestinal: Negative.    Genitourinary: Negative.    Musculoskeletal:  Positive for falls and joint pain.   Skin: Negative.    Neurological:  Positive for weakness.   Endo/Heme/Allergies: Negative.    Psychiatric/Behavioral: Negative.       GENERAL PHYSICAL EXAM:   There were no vitals taken for this visit.   GEN: well developed, well nourished, no acute distress   HENT: Normocephalic, atraumatic   EYES: No discharge, conjunctiva normal   NECK: Supple, non-tender   PULM: No wheezing, no respiratory distress   CV: RRR   ABD: Soft, non-tender    ORTHO  EXAM:   Examination of the left hand and thumb reveals that there is mild to moderate edema.  There is some ecchymosis present.  There is no angular deformity.  Palpation produces tenderness over the region of the IP joint.  There was no significant tenderness over the tip of the finger or over the base of the thumb.  He does have sensation intact over the tip of the thumb and capillary refill is less than 2 seconds    RADIOLOGY:   X-rays of the left thumb were taken in clinic today there is noted to be a fracture of the proximal phalanx.  This is intra-articular.  It is non displaced.    ASSESSMENT:   Left thumb intra-articular proximal phalanx fracture    PLAN:  1. Will place him into an Exos thumb splint     2.  He will be limited weight-bearing with the left hand     3.  Will follow up in 2 weeks with an x-ray of the left thumb for further evaluation

## 2025-02-18 DIAGNOSIS — S62.619A CLOSED FRACTURE OF PROXIMAL PHALANX OF DIGIT OF LEFT HAND, INITIAL ENCOUNTER: Primary | ICD-10-CM

## 2025-02-24 ENCOUNTER — OFFICE VISIT (OUTPATIENT)
Dept: ORTHOPEDICS | Facility: CLINIC | Age: 40
End: 2025-02-24
Payer: COMMERCIAL

## 2025-02-24 ENCOUNTER — HOSPITAL ENCOUNTER (OUTPATIENT)
Dept: RADIOLOGY | Facility: HOSPITAL | Age: 40
Discharge: HOME | End: 2025-02-24
Attending: ORTHOPAEDIC SURGERY
Payer: COMMERCIAL

## 2025-02-24 VITALS — HEIGHT: 67 IN | WEIGHT: 164.25 LBS | BODY MASS INDEX: 25.78 KG/M2

## 2025-02-24 DIAGNOSIS — S62.619A CLOSED FRACTURE OF PROXIMAL PHALANX OF DIGIT OF LEFT HAND, INITIAL ENCOUNTER: Primary | ICD-10-CM

## 2025-02-24 DIAGNOSIS — S62.619A CLOSED FRACTURE OF PROXIMAL PHALANX OF DIGIT OF LEFT HAND, INITIAL ENCOUNTER: ICD-10-CM

## 2025-02-24 PROCEDURE — 99999 PR PBB SHADOW E&M-EST. PATIENT-LVL III: CPT | Mod: PBBFAC,,, | Performed by: ORTHOPAEDIC SURGERY

## 2025-02-24 PROCEDURE — 99213 OFFICE O/P EST LOW 20 MIN: CPT | Mod: S$GLB,,, | Performed by: ORTHOPAEDIC SURGERY

## 2025-02-24 PROCEDURE — 3008F BODY MASS INDEX DOCD: CPT | Mod: CPTII,S$GLB,, | Performed by: ORTHOPAEDIC SURGERY

## 2025-02-24 PROCEDURE — 73140 X-RAY EXAM OF FINGER(S): CPT | Mod: 26,LT,, | Performed by: RADIOLOGY

## 2025-02-24 PROCEDURE — 73140 X-RAY EXAM OF FINGER(S): CPT | Mod: TC,PO,LT

## 2025-02-24 PROCEDURE — 1159F MED LIST DOCD IN RCRD: CPT | Mod: CPTII,S$GLB,, | Performed by: ORTHOPAEDIC SURGERY

## 2025-02-24 NOTE — PROGRESS NOTES
Delma returns to clinic today.  Has a history of a left thumb proximal phalanx fracture.  This is not significantly displaced and therefore we did place him into an Exos splint.  He is approximately 3 weeks post injury     Physical exam: Examination of the left thumb reveals that there is only minimal remaining edema.  There are no major skin changes.  Palpation still produces mild tenderness over the IP joint.  He does have sensation intact over the tip of the finger and capillary refill is less than 2 seconds     Radiology: X-rays of the left thumb were taken in clinic today.  He is noted have an intra-articular fracture of the proximal phalanx.  This has not significantly displaced.  The alignment is unchanged from the previous x-ray and there has a small amount of healing noted.      Assessment:  Left thumb proximal phalanx fracture     Plan:     1. Will continue his Exos splint that will allow him to begin very gentle active range of motion     2. He will continue to be limited to nonweightbearing to the thumb     3. He will follow up in 3 weeks with an x-ray of the left thumb at which point I will consider weaning out of the splint and beginning more aggressive range of motion

## 2025-03-13 DIAGNOSIS — S62.619A CLOSED FRACTURE OF PROXIMAL PHALANX OF DIGIT OF LEFT HAND, INITIAL ENCOUNTER: Primary | ICD-10-CM

## 2025-03-17 ENCOUNTER — HOSPITAL ENCOUNTER (OUTPATIENT)
Dept: RADIOLOGY | Facility: HOSPITAL | Age: 40
Discharge: HOME | End: 2025-03-17
Attending: ORTHOPAEDIC SURGERY
Payer: COMMERCIAL

## 2025-03-17 ENCOUNTER — OFFICE VISIT (OUTPATIENT)
Dept: ORTHOPEDICS | Facility: CLINIC | Age: 40
End: 2025-03-17
Payer: COMMERCIAL

## 2025-03-17 DIAGNOSIS — S62.619A CLOSED FRACTURE OF PROXIMAL PHALANX OF DIGIT OF LEFT HAND, INITIAL ENCOUNTER: Primary | ICD-10-CM

## 2025-03-17 DIAGNOSIS — S62.619A CLOSED FRACTURE OF PROXIMAL PHALANX OF DIGIT OF LEFT HAND, INITIAL ENCOUNTER: ICD-10-CM

## 2025-03-17 PROCEDURE — 73140 X-RAY EXAM OF FINGER(S): CPT | Mod: 26,LT,, | Performed by: RADIOLOGY

## 2025-03-17 PROCEDURE — 99024 POSTOP FOLLOW-UP VISIT: CPT | Mod: S$GLB,,, | Performed by: ORTHOPAEDIC SURGERY

## 2025-03-17 PROCEDURE — 1159F MED LIST DOCD IN RCRD: CPT | Mod: CPTII,S$GLB,, | Performed by: ORTHOPAEDIC SURGERY

## 2025-03-17 PROCEDURE — 99999 PR PBB SHADOW E&M-EST. PATIENT-LVL II: CPT | Mod: PBBFAC,,, | Performed by: ORTHOPAEDIC SURGERY

## 2025-03-17 PROCEDURE — 73140 X-RAY EXAM OF FINGER(S): CPT | Mod: TC,PO,LT

## 2025-03-17 NOTE — PROGRESS NOTES
Addended by: MARCIAL BENDER on: 2/24/2022 07:58 AM     Modules accepted: Orders     Mr rodriguez returns to clinic today.  Has a history of left thumb proximal phalanx intra-articular fracture.  He has been in an Exos splint but it has started to work gently on some motion.  He is here today for follow-up.  He is approximately 6 weeks post injury     Physical exam: Examination of the left thumb reveals that there is no significant remaining edema.  There are no major skin changes.  Palpation only produces minimal tenderness.  He is able to flex to approximately 30° and fully extend at the IP joint.  He does have sensation intact at the tip and capillary refill is less than 2 seconds     Radiology: X-rays of the left thumb were taken in clinic today.  He is noted have an intra-articular fracture of the proximal phalanx.  This remains nondisplaced and those have callus formation across the fracture site.      Assessment: Left thumb proximal phalanx fracture     Plan:     1. We will will wean out of the Exos splint.  He will only wear the splint for heavy activity     2. I did discuss a home range of motion program with him.  He will start doing that today     3. He will follow up in 3 weeks with a repeat x-ray of the left thumb and for a check of his range of motion at which point we will consider any need for therapy and discontinue splinting completely

## 2025-04-03 DIAGNOSIS — S62.619A CLOSED FRACTURE OF PROXIMAL PHALANX OF DIGIT OF LEFT HAND, INITIAL ENCOUNTER: Primary | ICD-10-CM

## 2025-08-12 ENCOUNTER — TELEPHONE (OUTPATIENT)
Dept: INTERNAL MEDICINE | Facility: CLINIC | Age: 40
End: 2025-08-12
Payer: COMMERCIAL

## 2025-08-13 ENCOUNTER — CLINICAL SUPPORT (OUTPATIENT)
Dept: INTERNAL MEDICINE | Facility: CLINIC | Age: 40
End: 2025-08-13
Payer: COMMERCIAL

## 2025-08-13 ENCOUNTER — OFFICE VISIT (OUTPATIENT)
Dept: INTERNAL MEDICINE | Facility: CLINIC | Age: 40
End: 2025-08-13
Payer: COMMERCIAL

## 2025-08-13 VITALS
SYSTOLIC BLOOD PRESSURE: 124 MMHG | HEART RATE: 80 BPM | DIASTOLIC BLOOD PRESSURE: 80 MMHG | BODY MASS INDEX: 24.72 KG/M2 | WEIGHT: 157.88 LBS | OXYGEN SATURATION: 97 %

## 2025-08-13 DIAGNOSIS — Z78.9 VEGAN DIET: ICD-10-CM

## 2025-08-13 DIAGNOSIS — Z00.00 ANNUAL PHYSICAL EXAM: Primary | ICD-10-CM

## 2025-08-13 DIAGNOSIS — E53.8 VITAMIN B12 DEFICIENCY: ICD-10-CM

## 2025-08-13 DIAGNOSIS — E55.9 VITAMIN D DEFICIENCY: ICD-10-CM

## 2025-08-13 LAB
25(OH)D3+25(OH)D2 SERPL-MCNC: 29 NG/ML (ref 30–96)
ALBUMIN SERPL BCP-MCNC: 4.7 G/DL (ref 3.5–5.2)
ALP SERPL-CCNC: 67 UNIT/L (ref 40–150)
ALT SERPL W/O P-5'-P-CCNC: 43 UNIT/L (ref 0–55)
ANION GAP (OHS): 7 MMOL/L (ref 8–16)
AST SERPL-CCNC: 35 UNIT/L (ref 0–50)
BILIRUB SERPL-MCNC: 0.5 MG/DL (ref 0.1–1)
BUN SERPL-MCNC: 14 MG/DL (ref 6–20)
CALCIUM SERPL-MCNC: 9.4 MG/DL (ref 8.7–10.5)
CHLORIDE SERPL-SCNC: 105 MMOL/L (ref 95–110)
CHOLEST SERPL-MCNC: 140 MG/DL (ref 120–199)
CHOLEST/HDLC SERPL: 3.6 {RATIO} (ref 2–5)
CO2 SERPL-SCNC: 30 MMOL/L (ref 23–29)
CREAT SERPL-MCNC: 0.9 MG/DL (ref 0.5–1.4)
EAG (OHS): 114 MG/DL (ref 68–131)
ERYTHROCYTE [DISTWIDTH] IN BLOOD BY AUTOMATED COUNT: 12.5 % (ref 11.5–14.5)
GFR SERPLBLD CREATININE-BSD FMLA CKD-EPI: >60 ML/MIN/1.73/M2
GLUCOSE SERPL-MCNC: 77 MG/DL (ref 70–110)
HBA1C MFR BLD: 5.6 % (ref 4–5.6)
HCT VFR BLD AUTO: 42.7 % (ref 40–54)
HDLC SERPL-MCNC: 39 MG/DL (ref 40–75)
HDLC SERPL: 27.9 % (ref 20–50)
HGB BLD-MCNC: 14.3 GM/DL (ref 14–18)
LDLC SERPL CALC-MCNC: 78.8 MG/DL (ref 63–159)
MCH RBC QN AUTO: 30.2 PG (ref 27–31)
MCHC RBC AUTO-ENTMCNC: 33.5 G/DL (ref 32–36)
MCV RBC AUTO: 90 FL (ref 82–98)
NONHDLC SERPL-MCNC: 101 MG/DL
PLATELET # BLD AUTO: 316 K/UL (ref 150–450)
PMV BLD AUTO: 9.6 FL (ref 9.2–12.9)
POTASSIUM SERPL-SCNC: 3.7 MMOL/L (ref 3.5–5.1)
PROT SERPL-MCNC: 7.3 GM/DL (ref 6–8.4)
RBC # BLD AUTO: 4.73 M/UL (ref 4.6–6.2)
SODIUM SERPL-SCNC: 142 MMOL/L (ref 136–145)
TRIGL SERPL-MCNC: 111 MG/DL (ref 30–150)
TSH SERPL-ACNC: 0.72 UIU/ML (ref 0.4–4)
VIT B12 SERPL-MCNC: 233 PG/ML (ref 210–950)
WBC # BLD AUTO: 11.4 K/UL (ref 3.9–12.7)

## 2025-08-13 PROCEDURE — 3074F SYST BP LT 130 MM HG: CPT | Mod: CPTII,S$GLB,, | Performed by: STUDENT IN AN ORGANIZED HEALTH CARE EDUCATION/TRAINING PROGRAM

## 2025-08-13 PROCEDURE — 99396 PREV VISIT EST AGE 40-64: CPT | Mod: 25,S$GLB,, | Performed by: STUDENT IN AN ORGANIZED HEALTH CARE EDUCATION/TRAINING PROGRAM

## 2025-08-13 PROCEDURE — 82306 VITAMIN D 25 HYDROXY: CPT

## 2025-08-13 PROCEDURE — 82607 VITAMIN B-12: CPT

## 2025-08-13 PROCEDURE — 85027 COMPLETE CBC AUTOMATED: CPT

## 2025-08-13 PROCEDURE — 99999 PR PBB SHADOW E&M-EST. PATIENT-LVL I: CPT | Mod: PBBFAC,,,

## 2025-08-13 PROCEDURE — 83036 HEMOGLOBIN GLYCOSYLATED A1C: CPT

## 2025-08-13 PROCEDURE — 99999 PR PBB SHADOW E&M-EST. PATIENT-LVL III: CPT | Mod: PBBFAC,,, | Performed by: STUDENT IN AN ORGANIZED HEALTH CARE EDUCATION/TRAINING PROGRAM

## 2025-08-13 PROCEDURE — 1159F MED LIST DOCD IN RCRD: CPT | Mod: CPTII,S$GLB,, | Performed by: STUDENT IN AN ORGANIZED HEALTH CARE EDUCATION/TRAINING PROGRAM

## 2025-08-13 PROCEDURE — 84443 ASSAY THYROID STIM HORMONE: CPT

## 2025-08-13 PROCEDURE — 3044F HG A1C LEVEL LT 7.0%: CPT | Mod: CPTII,S$GLB,, | Performed by: STUDENT IN AN ORGANIZED HEALTH CARE EDUCATION/TRAINING PROGRAM

## 2025-08-13 PROCEDURE — 3079F DIAST BP 80-89 MM HG: CPT | Mod: CPTII,S$GLB,, | Performed by: STUDENT IN AN ORGANIZED HEALTH CARE EDUCATION/TRAINING PROGRAM

## 2025-08-13 PROCEDURE — 80061 LIPID PANEL: CPT

## 2025-08-13 PROCEDURE — 3008F BODY MASS INDEX DOCD: CPT | Mod: CPTII,S$GLB,, | Performed by: STUDENT IN AN ORGANIZED HEALTH CARE EDUCATION/TRAINING PROGRAM

## 2025-08-13 PROCEDURE — 80053 COMPREHEN METABOLIC PANEL: CPT

## 2025-08-13 PROCEDURE — 1160F RVW MEDS BY RX/DR IN RCRD: CPT | Mod: CPTII,S$GLB,, | Performed by: STUDENT IN AN ORGANIZED HEALTH CARE EDUCATION/TRAINING PROGRAM
